# Patient Record
Sex: FEMALE | Race: BLACK OR AFRICAN AMERICAN | NOT HISPANIC OR LATINO | Employment: FULL TIME | URBAN - METROPOLITAN AREA
[De-identification: names, ages, dates, MRNs, and addresses within clinical notes are randomized per-mention and may not be internally consistent; named-entity substitution may affect disease eponyms.]

---

## 2017-04-13 ENCOUNTER — TELEPHONE (OUTPATIENT)
Dept: DERMATOLOGY | Facility: CLINIC | Age: 32
End: 2017-04-13

## 2017-04-13 NOTE — TELEPHONE ENCOUNTER
Spoke with pt and let her know that clinic is closed on tomorrow and no one will be in. Pt stated that she will go to her PCP since it is a urgent situation.

## 2017-04-13 NOTE — TELEPHONE ENCOUNTER
----- Message from Precious Diane sent at 4/13/2017  1:12 PM CDT -----  Contact: PT  PT is trying to come in tomorrow if possible for a chemical burn.    PT callback @[phone]

## 2017-04-14 ENCOUNTER — PATIENT MESSAGE (OUTPATIENT)
Dept: INTERNAL MEDICINE | Facility: CLINIC | Age: 32
End: 2017-04-14

## 2017-04-16 ENCOUNTER — PATIENT MESSAGE (OUTPATIENT)
Dept: ADMINISTRATIVE | Facility: OTHER | Age: 32
End: 2017-04-16

## 2017-05-01 ENCOUNTER — PATIENT MESSAGE (OUTPATIENT)
Dept: INTERNAL MEDICINE | Facility: CLINIC | Age: 32
End: 2017-05-01

## 2017-05-01 DIAGNOSIS — T30.4 CHEMICAL BURN: Primary | ICD-10-CM

## 2017-06-01 ENCOUNTER — DOCUMENTATION ONLY (OUTPATIENT)
Dept: INTERNAL MEDICINE | Facility: CLINIC | Age: 32
End: 2017-06-01

## 2017-06-01 NOTE — PROGRESS NOTES
OUTSIDE RECORDS FROM [facility] REVIEWED    Diagnosed with vitiligo, acne vulgaris, postinflammatory hyperpigmentation.

## 2017-07-24 ENCOUNTER — OFFICE VISIT (OUTPATIENT)
Dept: OBSTETRICS AND GYNECOLOGY | Facility: CLINIC | Age: 32
End: 2017-07-24
Attending: OBSTETRICS & GYNECOLOGY
Payer: COMMERCIAL

## 2017-07-24 VITALS
WEIGHT: 141.13 LBS | SYSTOLIC BLOOD PRESSURE: 120 MMHG | HEIGHT: 67 IN | DIASTOLIC BLOOD PRESSURE: 70 MMHG | BODY MASS INDEX: 22.15 KG/M2

## 2017-07-24 DIAGNOSIS — N76.0 ACUTE VAGINITIS: ICD-10-CM

## 2017-07-24 DIAGNOSIS — Z01.419 WELL WOMAN EXAM WITH ROUTINE GYNECOLOGICAL EXAM: Primary | ICD-10-CM

## 2017-07-24 LAB
CANDIDA RRNA VAG QL PROBE: NEGATIVE
G VAGINALIS RRNA GENITAL QL PROBE: POSITIVE
T VAGINALIS RRNA GENITAL QL PROBE: NEGATIVE

## 2017-07-24 PROCEDURE — 99999 PR PBB SHADOW E&M-EST. PATIENT-LVL III: CPT | Mod: PBBFAC,,, | Performed by: OBSTETRICS & GYNECOLOGY

## 2017-07-24 PROCEDURE — 88175 CYTOPATH C/V AUTO FLUID REDO: CPT

## 2017-07-24 PROCEDURE — 87624 HPV HI-RISK TYP POOLED RSLT: CPT

## 2017-07-24 PROCEDURE — 99385 PREV VISIT NEW AGE 18-39: CPT | Mod: S$GLB,,, | Performed by: OBSTETRICS & GYNECOLOGY

## 2017-07-24 PROCEDURE — 87660 TRICHOMONAS VAGIN DIR PROBE: CPT

## 2017-07-24 PROCEDURE — 87480 CANDIDA DNA DIR PROBE: CPT

## 2017-07-24 RX ORDER — TACROLIMUS 1 MG/G
OINTMENT TOPICAL
COMMUNITY
Start: 2017-05-08 | End: 2018-05-24 | Stop reason: SDUPTHER

## 2017-07-24 RX ORDER — TRETINOIN 0.1 MG/G
GEL TOPICAL
Refills: 1 | COMMUNITY
Start: 2017-05-08 | End: 2018-04-30

## 2017-07-24 NOTE — PROGRESS NOTES
SUBJECTIVE:   32 y.o. female No obstetric history on file.  for annual routine Pap and checkup. Patient's last menstrual period was 07/03/2017 (exact date)..  She complains of vaginal itching.  She is not sexually active.  Her cycles are monthly and last 3-4 days        Past Medical History:   Diagnosis Date    Left thyroid nodule     Vitiligo      Past Surgical History:   Procedure Laterality Date    WISDOM TOOTH EXTRACTION       Social History     Social History    Marital status: Single     Spouse name: N/A    Number of children: N/A    Years of education: N/A     Occupational History    marketing      Social History Main Topics    Smoking status: Never Smoker    Smokeless tobacco: Never Used    Alcohol use Yes      Comment: socially    Drug use: No    Sexual activity: Not Currently     Birth control/ protection: None     Other Topics Concern    Not on file     Social History Narrative    No narrative on file     Family History   Problem Relation Age of Onset    Thyroid cancer Sister     Fibroids Mother     Hypertension Mother     Graves' disease Father     Ovarian cancer Neg Hx     Diabetes Neg Hx      OB History   No data available         Current Outpatient Prescriptions   Medication Sig Dispense Refill    tacrolimus (PROTOPIC) 0.1 % ointment       tretinoin (RETIN-A) 0.01 % gel apply TO DARK AREAS TWICE DAILY FOR UP TO 3 MONTHS  1     No current facility-administered medications for this visit.      Allergies: Review of patient's allergies indicates no known allergies.     ROS:  Constitutional: no weight loss, weight gain, fever, fatigue  Eyes:  No vision changes, glasses/contacts  ENT/Mouth: No ulcers, sinus problems, ears ringing, headache  Cardiovascular: No inability to lie flat, chest pain, exercise intolerance, swelling, heart palpitations  Respiratory: No wheezing, coughing blood, shortness of breath, or cough  Gastrointestinal: No diarrhea, bloody stool, nausea/vomiting,  "constipation, gas, hemorrhoids  Genitourinary: No blood in urine, painful urination, urgency of urination, frequency of urination, incomplete emptying, incontinence, abnormal bleeding, painful periods, heavy periods, vaginal discharge, vaginal odor, painful intercourse, sexual problems, bleeding after intercourse.  Musculoskeletal: No muscle weakness  Skin/Breast: No painful breasts, nipple discharge, masses, rash, ulcers  Neurological: No passing out, seizures, numbness, headache  Endocrine: No diabetes, hypothyroid, hyperthyroid, hot flashes, hair loss, abnormal hair growth, ance  Psychiatric: No depression, crying  Hematologic: No bruises, bleeding, swollen lymph nodes, anemia.      OBJECTIVE:   The patient appears well, alert, oriented x 3, in no distress.  /70   Ht 5' 6.5" (1.689 m)   Wt 64 kg (141 lb 1.5 oz)   LMP 07/03/2017 (Exact Date)   BMI 22.43 kg/m²   NECK: small goiter  SKIN: no acne, striae, hirsutism  BREAST EXAM: breasts appear normal, no suspicious masses, no skin or nipple changes or axillary nodes  ABDOMEN: no hernias, masses, or hepatosplenomegaly  GENITALIA: normal external genitalia, no erythema, no discharge  URETHRA: normal urethra, normal urethral meatus  VAGINA: Normal  CERVIX: no lesions or cervical motion tenderness  UTERUS: normal size, contour, position, consistency, mobility, non-tender  ADNEXA: normal adnexa and no mass, fullness, tenderness    \  ASSESSMENT:   Patti DENIS was seen today for annual exam, vaginal discharge and vaginal itching.    Diagnoses and all orders for this visit:    Well woman exam with routine gynecological exam  -     Liquid-based pap smear, screening  -     HPV High Risk Genotypes, PCR    Acute vaginitis  -     Vaginosis Screen by DNA Probe      "

## 2017-07-25 ENCOUNTER — PATIENT MESSAGE (OUTPATIENT)
Dept: OBSTETRICS AND GYNECOLOGY | Facility: CLINIC | Age: 32
End: 2017-07-25

## 2017-07-25 RX ORDER — METRONIDAZOLE 7.5 MG/G
1 GEL VAGINAL DAILY
Qty: 70 G | Refills: 0 | Status: SHIPPED | OUTPATIENT
Start: 2017-07-25 | End: 2017-07-30

## 2017-07-28 LAB
HPV HR 12 DNA CVX QL NAA+PROBE: NEGATIVE
HPV16 DNA SPEC QL NAA+PROBE: NEGATIVE
HPV18 DNA SPEC QL NAA+PROBE: NEGATIVE

## 2017-09-05 ENCOUNTER — PATIENT MESSAGE (OUTPATIENT)
Dept: OBSTETRICS AND GYNECOLOGY | Facility: CLINIC | Age: 32
End: 2017-09-05

## 2017-09-05 RX ORDER — FLUCONAZOLE 150 MG/1
150 TABLET ORAL DAILY
Qty: 1 TABLET | Refills: 1 | Status: SHIPPED | OUTPATIENT
Start: 2017-09-05 | End: 2017-09-06

## 2018-01-02 ENCOUNTER — TELEPHONE (OUTPATIENT)
Dept: INTERNAL MEDICINE | Facility: CLINIC | Age: 33
End: 2018-01-02

## 2018-01-02 ENCOUNTER — LAB VISIT (OUTPATIENT)
Dept: LAB | Facility: HOSPITAL | Age: 33
End: 2018-01-02
Attending: INTERNAL MEDICINE
Payer: COMMERCIAL

## 2018-01-02 ENCOUNTER — OFFICE VISIT (OUTPATIENT)
Dept: INTERNAL MEDICINE | Facility: CLINIC | Age: 33
End: 2018-01-02
Payer: COMMERCIAL

## 2018-01-02 VITALS
TEMPERATURE: 98 F | OXYGEN SATURATION: 98 % | SYSTOLIC BLOOD PRESSURE: 100 MMHG | DIASTOLIC BLOOD PRESSURE: 68 MMHG | HEART RATE: 98 BPM | BODY MASS INDEX: 22.43 KG/M2 | WEIGHT: 139.56 LBS | HEIGHT: 66 IN

## 2018-01-02 DIAGNOSIS — E04.1 LEFT THYROID NODULE: ICD-10-CM

## 2018-01-02 DIAGNOSIS — K64.4 EXTERNAL HEMORRHOID: ICD-10-CM

## 2018-01-02 DIAGNOSIS — Z80.8 FAMILY HISTORY OF THYROID CANCER: ICD-10-CM

## 2018-01-02 DIAGNOSIS — J06.9 UPPER RESPIRATORY TRACT INFECTION, UNSPECIFIED TYPE: Primary | ICD-10-CM

## 2018-01-02 DIAGNOSIS — J06.9 UPPER RESPIRATORY TRACT INFECTION, UNSPECIFIED TYPE: ICD-10-CM

## 2018-01-02 DIAGNOSIS — K64.4 EXTERNAL HEMORRHOID: Primary | ICD-10-CM

## 2018-01-02 LAB
ALBUMIN SERPL BCP-MCNC: 4.2 G/DL
ALP SERPL-CCNC: 67 U/L
ALT SERPL W/O P-5'-P-CCNC: 13 U/L
ANION GAP SERPL CALC-SCNC: 7 MMOL/L
AST SERPL-CCNC: 22 U/L
BASOPHILS # BLD AUTO: 0.01 K/UL
BASOPHILS NFR BLD: 0.4 %
BILIRUB SERPL-MCNC: 0.6 MG/DL
BUN SERPL-MCNC: 5 MG/DL
CALCIUM SERPL-MCNC: 9.2 MG/DL
CHLORIDE SERPL-SCNC: 105 MMOL/L
CO2 SERPL-SCNC: 27 MMOL/L
CREAT SERPL-MCNC: 0.8 MG/DL
DIFFERENTIAL METHOD: ABNORMAL
EOSINOPHIL # BLD AUTO: 0 K/UL
EOSINOPHIL NFR BLD: 0.4 %
ERYTHROCYTE [DISTWIDTH] IN BLOOD BY AUTOMATED COUNT: 12.8 %
EST. GFR  (AFRICAN AMERICAN): >60 ML/MIN/1.73 M^2
EST. GFR  (NON AFRICAN AMERICAN): >60 ML/MIN/1.73 M^2
FLUAV AG SPEC QL IA: NEGATIVE
FLUBV AG SPEC QL IA: NEGATIVE
GLUCOSE SERPL-MCNC: 87 MG/DL
HCT VFR BLD AUTO: 38.7 %
HGB BLD-MCNC: 12.1 G/DL
LYMPHOCYTES # BLD AUTO: 0.9 K/UL
LYMPHOCYTES NFR BLD: 38.1 %
MCH RBC QN AUTO: 27.8 PG
MCHC RBC AUTO-ENTMCNC: 31.3 G/DL
MCV RBC AUTO: 89 FL
MONOCYTES # BLD AUTO: 0.7 K/UL
MONOCYTES NFR BLD: 30.3 %
NEUTROPHILS # BLD AUTO: 0.7 K/UL
NEUTROPHILS NFR BLD: 30.8 %
NRBC BLD-RTO: 0 /100 WBC
PLATELET # BLD AUTO: 137 K/UL
PMV BLD AUTO: 12.1 FL
POTASSIUM SERPL-SCNC: 3.8 MMOL/L
PROT SERPL-MCNC: 7.4 G/DL
RBC # BLD AUTO: 4.36 M/UL
SODIUM SERPL-SCNC: 139 MMOL/L
SPECIMEN SOURCE: NORMAL
T4 FREE SERPL-MCNC: 0.96 NG/DL
TSH SERPL DL<=0.005 MIU/L-ACNC: 0.3 UIU/ML
WBC # BLD AUTO: 2.31 K/UL

## 2018-01-02 PROCEDURE — 80053 COMPREHEN METABOLIC PANEL: CPT

## 2018-01-02 PROCEDURE — 99999 PR PBB SHADOW E&M-EST. PATIENT-LVL IV: CPT | Mod: PBBFAC,,, | Performed by: INTERNAL MEDICINE

## 2018-01-02 PROCEDURE — 84443 ASSAY THYROID STIM HORMONE: CPT

## 2018-01-02 PROCEDURE — 84439 ASSAY OF FREE THYROXINE: CPT

## 2018-01-02 PROCEDURE — 87400 INFLUENZA A/B EACH AG IA: CPT

## 2018-01-02 PROCEDURE — 85025 COMPLETE CBC W/AUTO DIFF WBC: CPT

## 2018-01-02 PROCEDURE — 36415 COLL VENOUS BLD VENIPUNCTURE: CPT

## 2018-01-02 PROCEDURE — 99214 OFFICE O/P EST MOD 30 MIN: CPT | Mod: S$GLB,,, | Performed by: INTERNAL MEDICINE

## 2018-01-02 RX ORDER — METHYLPREDNISOLONE 4 MG/1
TABLET ORAL
Qty: 1 PACKAGE | Refills: 0 | Status: SHIPPED | OUTPATIENT
Start: 2018-01-02 | End: 2018-01-23

## 2018-01-02 RX ORDER — SENNOSIDES 25 MG/1
TABLET, FILM COATED ORAL
Qty: 30 G | Refills: 1 | Status: SHIPPED | OUTPATIENT
Start: 2018-01-02 | End: 2018-04-30

## 2018-01-02 NOTE — PROGRESS NOTES
"Subjective:       Patient ID: Puja Hernandez is a 32 y.o. female.    Chief Complaint: Extremity Weakness and Hemorrhoids    HPI urgent    5 days ago, difficulty swallowing 2/2 feeling of throat swelling. Able to swallow though. Prior to that she was at a rooftop bar. No itching. Woke up on Sat and everything went back to normal. Noticed pain in the anus when sitting. Reports this is due to hemorrhoids (able to feel it). No bloody stools. Using preparation H OTC. 1 of them improved but the other was still present.   Sunday, felt diffuse weakness and body aches, chills, fatigue. Cough, unproductive. No rhinorrhea. No nasal congestion.   Mon - took theraflu and slept the whole day. Found old amoxil and took that.   Today - feel a little better. When she woke up this morning still w/ some subjective fever and chills and sore throat. However not currently. Doesn't think any swollen glands.     Review of Systems   Constitutional: Positive for activity change. Negative for unexpected weight change.   HENT: Negative for hearing loss, rhinorrhea and trouble swallowing.    Eyes: Negative for discharge and visual disturbance.   Respiratory: Negative for chest tightness and wheezing.    Cardiovascular: Negative for chest pain and palpitations.   Gastrointestinal: Negative for blood in stool, constipation, diarrhea and vomiting.   Endocrine: Negative for polydipsia and polyuria.   Genitourinary: Negative for difficulty urinating, dysuria, hematuria and menstrual problem.   Musculoskeletal: Negative for arthralgias, joint swelling and neck pain.   Neurological: Positive for weakness and headaches.   Psychiatric/Behavioral: Negative for confusion and dysphoric mood.     as above in HPI.     Objective:      Physical Exam    /68   Pulse 98   Temp 97.9 °F (36.6 °C)   Ht 5' 6" (1.676 m)   Wt 63.3 kg (139 lb 8.8 oz)   SpO2 98%   BMI 22.52 kg/m²     Gen - A+OX4, NAD  HEENT - PERRL, OP mildly erythematous. TM normal. No sinus " tenderness to palpation. Mildly erythematous nasal turbinates.   Neck - no LAD. Large L thyroid nodule.   CV - RRR  CHEST - CTAB, no wheezing/rhonchi/crackles. No stridor.   Abd - S/NT/ND/+BS  Ext -2 + B radial and DP pulses. No LE edema.   MSK - No spinal tenderness to palpation.   Rectal - large ext hemorrhoid w/ pain to touch. No fissures.    Assessment/Plan     Puja DENIS was seen today for extremity weakness and hemorrhoids.    Diagnoses and all orders for this visit:    Upper respiratory tract infection, unspecified type  -     Influenza antigen Nasal Swab  -     methylPREDNISolone (MEDROL DOSEPACK) 4 mg tablet; use as directed  -     CBC auto differential; Future  -     Comprehensive metabolic panel; Future    Left thyroid nodule  -     US Soft Tissue Head Neck Thyroid; Future  -     TSH; Future    Family history of thyroid cancer  -     US Soft Tissue Head Neck Thyroid; Future  -     TSH; Future    External hemorrhoid - warm sitz bath. Avoid constipation. High fiber diet. Stool softeners as needed.   -     lidocaine-hydrocortisone-aloe 2-2 % Kit; Place 1 application rectally 2 (two) times daily.  -     Ambulatory Referral to Colorectal Surgery      Return in about 3 months (around 4/2/2018).      Herlinda Hernandez MD  Department of Internal Medicine - [facility]  2:18 PM

## 2018-01-02 NOTE — TELEPHONE ENCOUNTER
----- Message from Maulik Stanford sent at 1/2/2018  3:42 PM CST -----  Contact: Patient [phone]  She said the pharmacy sent a fax to you today to put in a cheaper medication in lieu of the lidocaine-hydrocortisone-aloe 2-2 % Kit.    She is also making the request for a more affordable prescription.    Thank you

## 2018-01-03 ENCOUNTER — TELEPHONE (OUTPATIENT)
Dept: INTERNAL MEDICINE | Facility: CLINIC | Age: 33
End: 2018-01-03

## 2018-01-03 ENCOUNTER — HOSPITAL ENCOUNTER (OUTPATIENT)
Dept: RADIOLOGY | Facility: OTHER | Age: 33
Discharge: HOME | End: 2018-01-03
Attending: INTERNAL MEDICINE
Payer: COMMERCIAL

## 2018-01-03 DIAGNOSIS — Z80.8 FAMILY HISTORY OF THYROID CANCER: ICD-10-CM

## 2018-01-03 DIAGNOSIS — E04.1 THYROID NODULE: ICD-10-CM

## 2018-01-03 DIAGNOSIS — D72.810 LYMPHOCYTOPENIA: ICD-10-CM

## 2018-01-03 DIAGNOSIS — E04.1 LEFT THYROID NODULE: ICD-10-CM

## 2018-01-03 DIAGNOSIS — D69.6 THROMBOCYTOPENIA: ICD-10-CM

## 2018-01-03 DIAGNOSIS — Z00.00 ANNUAL PHYSICAL EXAM: ICD-10-CM

## 2018-01-03 DIAGNOSIS — E05.90 SUBCLINICAL HYPERTHYROIDISM: Primary | ICD-10-CM

## 2018-01-03 PROCEDURE — 76536 US EXAM OF HEAD AND NECK: CPT | Mod: TC

## 2018-01-03 PROCEDURE — 76536 US EXAM OF HEAD AND NECK: CPT | Mod: 26,,, | Performed by: RADIOLOGY

## 2018-01-03 NOTE — TELEPHONE ENCOUNTER
Already called pt and left VM to call back. Thyroid-stimulating hormone is mildly low.  However her active thyroid hormones are normal.  Her white cell count and platelets are also mildly low. As she was feeling ill, this may be a fluke.  Let's repeat the lab work including a fasting cholesterol panel a week prior to her returning to see me in March.    Enlarged thyroid nodule 2.1cm in 2011-->5.7cm on US yesterday. F/u w/ endocrinology.

## 2018-01-03 NOTE — TELEPHONE ENCOUNTER
----- Message from Lizbeth Hardin sent at 1/3/2018  2:14 PM CST -----  Contact: Patient [phone]  Returned Call    Who left the message: Herlinda Hernandez MD    When did the practice call: 01/03/2018 7:11am    Please advise.    Thank You

## 2018-04-30 ENCOUNTER — PATIENT MESSAGE (OUTPATIENT)
Dept: INTERNAL MEDICINE | Facility: CLINIC | Age: 33
End: 2018-04-30

## 2018-04-30 ENCOUNTER — OFFICE VISIT (OUTPATIENT)
Dept: ENDOCRINOLOGY | Facility: CLINIC | Age: 33
End: 2018-04-30
Payer: COMMERCIAL

## 2018-04-30 VITALS
DIASTOLIC BLOOD PRESSURE: 68 MMHG | HEIGHT: 66 IN | SYSTOLIC BLOOD PRESSURE: 110 MMHG | BODY MASS INDEX: 21.44 KG/M2 | WEIGHT: 133.38 LBS | HEART RATE: 70 BPM

## 2018-04-30 DIAGNOSIS — E04.1 LEFT THYROID NODULE: Primary | ICD-10-CM

## 2018-04-30 DIAGNOSIS — R79.89 ABNORMAL TSH: ICD-10-CM

## 2018-04-30 DIAGNOSIS — L81.9 HYPOPIGMENTATION: Primary | ICD-10-CM

## 2018-04-30 PROCEDURE — 99999 PR PBB SHADOW E&M-EST. PATIENT-LVL III: CPT | Mod: PBBFAC,,, | Performed by: INTERNAL MEDICINE

## 2018-04-30 PROCEDURE — 99204 OFFICE O/P NEW MOD 45 MIN: CPT | Mod: S$GLB,,, | Performed by: INTERNAL MEDICINE

## 2018-04-30 RX ORDER — ASCORBIC ACID 1000 MG
1 TABLET ORAL DAILY
COMMUNITY
End: 2024-02-07

## 2018-04-30 NOTE — PROGRESS NOTES
"Subjective:      Patient ID: Puja Hernandez is a 33 y.o. female.    Chief Complaint: Thyroid nodule     is presenting for new evaluation and treatment of ***    Review of Systems    Objective:     /68 (BP Location: Left arm, Patient Position: Sitting, BP Method: Medium (Manual))   Pulse 70   Ht 5' 6" (1.676 m)   Wt 60.5 kg (133 lb 6.1 oz)   LMP 04/17/2018   BMI 21.53 kg/m²      Physical Exam    Assessment:     1. Left thyroid nodule        Plan:   No problem-specific Assessment & Plan notes found for this encounter.      No Follow-up on file.    Discussed with  ***    Grady Jimenez MD  "

## 2018-04-30 NOTE — PROGRESS NOTES
Subjective:      Patient ID: Puja Hernandez is a 33 y.o. female.    Chief Complaint: Thyroid nodules     is presenting for new evaluation and treatment of thyroid nodule.    Had previously seen Dr. Marlow in 2011.    Large left thyroid nodule that has grown in size from 2 to 5cm. (from 2011 to 2018 US study).  Had prior fna of left thyroid nodule in 2011 that was benign. No other FNA's performed.    1/2018 thyroid US:  The thyroid is normal in size.    Right lobe: 4.2 x 1.3 x 2.0 cm. 4 mm cystic nodule is TI-RADS category 1.    Left lobe: 6.6 x 2.7 x 3.6 cm.     5.7 cm solid, primarily mildly hypoechoic, wider than tall nodule with mild irregularity of its margins and no echogenic foci is TI-RADS category 4, previously measured 2.1 cm on 5/26/11.    Normal appearing cervical chain lymph nodes are visualized.   Impression       5.7 cm TI-RADS category for nodule of the left thyroid lobe, enlarged from 2.1 cm. FNA is recommended for TI-RADS category 4 nodules measuring 1.5 cm or larger; however, note is made that this nodule reportedly previously underwent FNA in 2011 with benign pathology; clinical considerations will determine the need for additional FNA.     Endorses night sweats, hair loss, tremors.  Denies dysphagia, hoarseness, difficulty breathing    Half sister with thyroid cancer (same father)  Dad has Grave's disease and had thyroid surgery    Works in marketing  No formal exercise        Review of Systems   Constitutional: Positive for diaphoresis. Negative for unexpected weight change.   Eyes: Negative for visual disturbance.   Respiratory: Negative for shortness of breath.    Cardiovascular: Negative for chest pain.   Gastrointestinal: Negative for abdominal pain.   Musculoskeletal: Negative for arthralgias.   Skin: Negative for wound.        Hair loss   Neurological: Positive for tremors. Negative for headaches.   Hematological: Does not bruise/bleed easily.   Psychiatric/Behavioral:  "Negative for sleep disturbance.       Objective:     /68 (BP Location: Left arm, Patient Position: Sitting, BP Method: Medium (Manual))   Pulse 70   Ht 5' 6" (1.676 m)   Wt 60.5 kg (133 lb 6.1 oz)   LMP 04/17/2018   BMI 21.53 kg/m²      Physical Exam   Constitutional: She appears well-developed and well-nourished.   HENT:   Head: Normocephalic and atraumatic.   Eyes: Conjunctivae are normal. Right eye exhibits no discharge. Left eye exhibits no discharge.   Neck: Normal range of motion. No tracheal deviation present. Thyromegaly present.   Large left thyroid nodule  Negative Sitka's    Cardiovascular: Normal rate.    No murmur heard.  Pulmonary/Chest: Effort normal and breath sounds normal.   Abdominal: Bowel sounds are normal.   Musculoskeletal: Normal range of motion. She exhibits no edema.   Skin: Skin is warm and dry.   Scattered vitiligo    Psychiatric: She has a normal mood and affect. Judgment normal.   Nursing note and vitals reviewed.    Results for JABIER REEVES (MRN [id number])    Ref. Range 5/26/2011 10:04 7/26/2011 12:25 5/14/2015 11:51 1/2/2018 14:56   TSH Latest Ref Range: 0.400 - 4.000 uIU/mL 0.730  0.770 0.300 (L)   Free T4 Latest Ref Range: 0.71 - 1.51 ng/dL   1.30 0.96   Thyroperoxidase Antibodies Latest Ref Range: <6.0 IU/mL  Negative <6.0        Assessment:     1. Left thyroid nodule    2. Abnormal TSH        Plan:   1. Given interval enlargement of thyroid nodule would recommend repeat FNA. If benign, would still consider surgical lobectomy given size and interval growth. Discussed that despite only lobectomy it's possible that she could require thyroid hormone replacement, however that would not be known till after surgery. With a suppressed TSH, it's possible that nodule is hot. However, would still consider FNA given interval growth.  2. Recheck TSH. Had been previously ordered by Dr. Hernandez and scheduled for tomorrow May 1. Have asked Ms. Reeves to email me later this week " after she has had labs drawn.    No Follow-up on file.    Discussed with Dr. Kal Jimenez MD     I, Radha Bowden MD,  have personally taken the history and examined the patient and agree with the resident's note as stated above.

## 2018-05-01 ENCOUNTER — PATIENT MESSAGE (OUTPATIENT)
Dept: ENDOCRINOLOGY | Facility: CLINIC | Age: 33
End: 2018-05-01

## 2018-05-01 ENCOUNTER — LAB VISIT (OUTPATIENT)
Dept: LAB | Facility: HOSPITAL | Age: 33
End: 2018-05-01
Attending: INTERNAL MEDICINE
Payer: COMMERCIAL

## 2018-05-01 DIAGNOSIS — E05.90 SUBCLINICAL HYPERTHYROIDISM: ICD-10-CM

## 2018-05-01 DIAGNOSIS — D69.6 THROMBOCYTOPENIA: ICD-10-CM

## 2018-05-01 DIAGNOSIS — D72.810 LYMPHOCYTOPENIA: ICD-10-CM

## 2018-05-01 DIAGNOSIS — Z00.00 ANNUAL PHYSICAL EXAM: ICD-10-CM

## 2018-05-01 LAB
BASOPHILS # BLD AUTO: 0.01 K/UL
BASOPHILS NFR BLD: 0.3 %
CHOLEST SERPL-MCNC: 164 MG/DL
CHOLEST/HDLC SERPL: 3 {RATIO}
DIFFERENTIAL METHOD: ABNORMAL
EOSINOPHIL # BLD AUTO: 0.1 K/UL
EOSINOPHIL NFR BLD: 1.4 %
ERYTHROCYTE [DISTWIDTH] IN BLOOD BY AUTOMATED COUNT: 12.8 %
HCT VFR BLD AUTO: 37.6 %
HDLC SERPL-MCNC: 55 MG/DL
HDLC SERPL: 33.5 %
HGB BLD-MCNC: 12 G/DL
IMM GRANULOCYTES # BLD AUTO: 0 K/UL
IMM GRANULOCYTES NFR BLD AUTO: 0 %
LDLC SERPL CALC-MCNC: 94.2 MG/DL
LYMPHOCYTES # BLD AUTO: 1.6 K/UL
LYMPHOCYTES NFR BLD: 43 %
MCH RBC QN AUTO: 28.3 PG
MCHC RBC AUTO-ENTMCNC: 31.9 G/DL
MCV RBC AUTO: 89 FL
MONOCYTES # BLD AUTO: 0.3 K/UL
MONOCYTES NFR BLD: 7.7 %
NEUTROPHILS # BLD AUTO: 1.7 K/UL
NEUTROPHILS NFR BLD: 47.6 %
NONHDLC SERPL-MCNC: 109 MG/DL
NRBC BLD-RTO: 0 /100 WBC
PLATELET # BLD AUTO: 148 K/UL
PMV BLD AUTO: 11.7 FL
RBC # BLD AUTO: 4.24 M/UL
T4 FREE SERPL-MCNC: 0.94 NG/DL
TRIGL SERPL-MCNC: 74 MG/DL
TSH SERPL DL<=0.005 MIU/L-ACNC: 0.86 UIU/ML
WBC # BLD AUTO: 3.63 K/UL

## 2018-05-01 PROCEDURE — 84443 ASSAY THYROID STIM HORMONE: CPT

## 2018-05-01 PROCEDURE — 36415 COLL VENOUS BLD VENIPUNCTURE: CPT

## 2018-05-01 PROCEDURE — 85025 COMPLETE CBC W/AUTO DIFF WBC: CPT

## 2018-05-01 PROCEDURE — 80061 LIPID PANEL: CPT

## 2018-05-01 PROCEDURE — 84439 ASSAY OF FREE THYROXINE: CPT

## 2018-05-03 ENCOUNTER — HOSPITAL ENCOUNTER (OUTPATIENT)
Dept: RADIOLOGY | Facility: HOSPITAL | Age: 33
Discharge: HOME | End: 2018-05-03
Attending: INTERNAL MEDICINE
Payer: COMMERCIAL

## 2018-05-03 DIAGNOSIS — E04.1 LEFT THYROID NODULE: ICD-10-CM

## 2018-05-03 PROCEDURE — 10022 US FINE NEEDLE ASPIRATION WITH IMAGING: CPT

## 2018-05-03 PROCEDURE — 10022 US FINE NEEDLE ASPIRATION WITH IMAGING: CPT | Mod: ,,, | Performed by: RADIOLOGY

## 2018-05-03 PROCEDURE — 88173 CYTOPATH EVAL FNA REPORT: CPT | Mod: 26,,, | Performed by: PATHOLOGY

## 2018-05-03 PROCEDURE — 88173 CYTOPATH EVAL FNA REPORT: CPT | Performed by: PATHOLOGY

## 2018-05-03 PROCEDURE — 76942 ECHO GUIDE FOR BIOPSY: CPT | Mod: 26,,, | Performed by: RADIOLOGY

## 2018-05-03 NOTE — H&P
Radiology History & Physical      SUBJECTIVE:     Chief Complaint: thyroid nodule, left.    History of Present Illness:  Puja Hernandez is a 33 y.o. female who presents for thyroid FNA.    Past Medical History:   Diagnosis Date    Left thyroid nodule     Vitiligo      Past Surgical History:   Procedure Laterality Date    WISDOM TOOTH EXTRACTION         Home Meds:   Prior to Admission medications    Medication Sig Start Date End Date Taking? Authorizing Provider   ginkgo biloba 40 mg Tab Take 1 tablet by mouth Daily.    Historical Provider, MD   tacrolimus (PROTOPIC) 0.1 % ointment  5/8/17   Historical Provider, MD     Anticoagulants/Antiplatelets: no anticoagulation    Allergies: Review of patient's allergies indicates:  No Known Allergies  Sedation History:  no adverse reactions    Review of Systems:   Hematological: no known coagulopathies  Respiratory: no shortness of breath  Cardiovascular: no chest pain  Gastrointestinal: no abdominal pain  Genito-Urinary: no dysuria  Musculoskeletal: negative  Neurological: no TIA or stroke symptoms         OBJECTIVE:     Vital Signs (Most Recent)       Physical Exam:  ASA: 1  Mallampati: 1    General: no acute distress  Mental Status: alert and oriented to person, place and time  HEENT: normocephalic, atraumatic  Chest: unlabored breathing  Heart: regular heart rate  Abdomen: nondistended  Extremity: moves all extremities    Laboratory  No results found for: INR    Lab Results   Component Value Date    WBC 3.63 (L) 05/01/2018    HGB 12.0 05/01/2018    HCT 37.6 05/01/2018    MCV 89 05/01/2018     (L) 05/01/2018      Lab Results   Component Value Date    GLU 87 01/02/2018     01/02/2018    K 3.8 01/02/2018     01/02/2018    CO2 27 01/02/2018    BUN 5 (L) 01/02/2018    CREATININE 0.8 01/02/2018    CALCIUM 9.2 01/02/2018    ALT 13 01/02/2018    AST 22 01/02/2018    ALBUMIN 4.2 01/02/2018    BILITOT 0.6 01/02/2018       ASSESSMENT/PLAN:     Sedation Plan:  none  Patient will undergo US guided FNA.    Khadar Ceja MD  Radiology

## 2018-05-03 NOTE — PROCEDURES
Radiology Post-Procedure Note    Pre Op Diagnosis: left thyroid nodule    Post Op Diagnosis: left thyroid nodule    Procedure: Ultrasound guided thyroid biopsy    Procedure performed by: Nelly Glover MD    Written Informed Consent Obtained: Yes    Specimen Removed: YES 5 pass FNA    Estimated Blood Loss: Minimal    Findings: Local anesthesia was used.    Finding aspiration was performed for evaluation of left sided thyroid nodule using ultrasound guidance.  The specimen was sent to the laboratory for further analysis.    There were no complications and the patient tolerated the procedure well.  Please see Imaging report for further details.    Khadar Ceja MD  Radiology

## 2018-05-07 ENCOUNTER — PATIENT MESSAGE (OUTPATIENT)
Dept: ENDOCRINOLOGY | Facility: HOSPITAL | Age: 33
End: 2018-05-07

## 2018-05-24 ENCOUNTER — OFFICE VISIT (OUTPATIENT)
Dept: DERMATOLOGY | Facility: CLINIC | Age: 33
End: 2018-05-24
Payer: COMMERCIAL

## 2018-05-24 DIAGNOSIS — L80 VITILIGO: Primary | ICD-10-CM

## 2018-05-24 DIAGNOSIS — L70.0 ACNE VULGARIS: ICD-10-CM

## 2018-05-24 DIAGNOSIS — E04.1 THYROID NODULE: ICD-10-CM

## 2018-05-24 DIAGNOSIS — Z13.0 ENCOUNTER FOR SCREENING FOR DISEASES OF THE BLOOD AND BLOOD-FORMING ORGANS AND CERTAIN DISORDERS INVOLVING THE IMMUNE MECHANISM: ICD-10-CM

## 2018-05-24 DIAGNOSIS — Z13.29 SCREENING FOR THYROID DISORDER: ICD-10-CM

## 2018-05-24 DIAGNOSIS — Z13.21 ENCOUNTER FOR VITAMIN DEFICIENCY SCREENING: ICD-10-CM

## 2018-05-24 PROCEDURE — 99203 OFFICE O/P NEW LOW 30 MIN: CPT | Mod: S$GLB,,, | Performed by: DERMATOLOGY

## 2018-05-24 RX ORDER — TACROLIMUS 1 MG/G
OINTMENT TOPICAL
Qty: 60 G | Refills: 5 | Status: SHIPPED | OUTPATIENT
Start: 2018-05-24 | End: 2020-01-06 | Stop reason: SDUPTHER

## 2018-05-24 RX ORDER — FLUOCINONIDE 0.5 MG/G
CREAM TOPICAL
Qty: 60 G | Refills: 2 | Status: SHIPPED | OUTPATIENT
Start: 2018-05-24 | End: 2020-01-06 | Stop reason: SDUPTHER

## 2018-05-24 RX ORDER — CLINDAMYCIN PHOSPHATE 10 MG/G
GEL TOPICAL
Qty: 60 G | Refills: 3 | Status: SHIPPED | OUTPATIENT
Start: 2018-05-24 | End: 2020-03-16 | Stop reason: SDUPTHER

## 2018-05-24 RX ORDER — DEXAMETHASONE 4 MG/1
TABLET ORAL
Qty: 16 TABLET | Refills: 0 | Status: SHIPPED | OUTPATIENT
Start: 2018-05-24 | End: 2019-01-31 | Stop reason: SDUPTHER

## 2018-05-24 NOTE — LETTER
May 24, 2018      Herlinda Hernandez MD  [address]  [place] [address]           [facility] - Dermatology  [address] [place] [id number]  Phone: [phone]  Fax: [phone]          Patient: Puja Hernandez   MR Number: [id number]   YOB: 1985   Date of Visit: 5/24/2018       Dear Dr. Herlinda Hernandez:    Thank you for referring Puja Hernandez to me for evaluation. Attached you will find relevant portions of my assessment and plan of care.    If you have questions, please do not hesitate to call me. I look forward to following Puja Hernandez along with you.    Sincerely,    Rebekah Joyce MD    Enclosure  CC:  No Recipients    If you would like to receive this communication electronically, please contact [email or web address] or [phone] to request more information on [employer] access.    For providers and/or their staff who would like to refer a patient to [facility], please contact us through our one-stop-shop provider referral line, [facility], at [phone].    If you feel you have received this communication in error or would no longer like to receive these types of communications, please e-mail [email or web address]

## 2018-05-24 NOTE — PROGRESS NOTES
Subjective:       Patient ID:  Puja Hernandez is a 33 y.o. female who presents for   Chief Complaint   Patient presents with    Acne     cystic bumps, hyperpigmation, 3 years, OTC not helping    Vitiligo     getting worse past 3 weeks, comes and goes,      Pt is here today with a c/o of acne to her face. Pt states that her acne has become deep and cystic. Acne is leaving hyperpigmented spots behind. Currently washing with cerave. Pt states that her skin is sensitive. She states that her skin is combination. Pt also complains of vitiligo. Pt states that she has had 1 or 2 stable areas of vitiligo on her right hand and elbow, but now she is getting several new spots for the last 2-3 weeks, and they are more pronounced.       Acne  - Initial  Affected locations: face  Duration: 3 years  Signs / symptoms: irritated and tender  Severity: mild  Timing: constant  Aggravated by: picking, stress and menses (diet)  Treatments tried: cerave wash; retin a was too harsh.  Improvement on treatment: mild    Vitiligo  - Initial  Affected locations: right upper leg, left upper leg, left arm, right arm, neck and chest  Duration: 3 weeks (has had spots off and on since age 16)  Signs and Symptoms: discoloration.  Severity: mild  Timing: constant  Aggravated by: stress (chemicals?)  Treatments tried: uses protopic periodically, bought a nbuvb lamp online but doesn't help much.  Improvement on treatment: no relief        Review of Systems   Skin: Negative for itching and rash.   Hematologic/Lymphatic: Does not bruise/bleed easily.        Objective:    Physical Exam   Constitutional: She appears well-developed and well-nourished. No distress.   HENT:   Mouth/Throat: Lips normal.    Eyes: Abnormal Lids.   (hypopigmented patch on left lower lid)No conjunctival no injection.   Neurological: She is alert and oriented to person, place, and time. She is not disoriented.   Psychiatric: She has a normal mood and affect.   Skin:   Areas  Examined (abnormalities noted in diagram):   Scalp / Hair Palpated and Inspected  Head / Face Inspection Performed  Neck Inspection Performed  Chest / Axilla Inspection Performed  Back Inspection Performed  RUE Inspected  LUE Inspection Performed  RLE Inspected  LLE Inspection Performed  Nails and Digits Inspection Performed                   Diagram Legend     Erythematous scaling macule/papule c/w actinic keratosis       Vascular papule c/w angioma      Pigmented verrucoid papule/plaque c/w seborrheic keratosis      Yellow umbilicated papule c/w sebaceous hyperplasia      Irregularly shaped tan macule c/w lentigo     1-2 mm smooth white papules consistent with Milia      Movable subcutaneous cyst with punctum c/w epidermal inclusion cyst      Subcutaneous movable cyst c/w pilar cyst      Firm pink to brown papule c/w dermatofibroma      Pedunculated fleshy papule(s) c/w skin tag(s)      Evenly pigmented macule c/w junctional nevus     Mildly variegated pigmented, slightly irregular-bordered macule c/w mildly atypical nevus      Flesh colored to evenly pigmented papule c/w intradermal nevus       Pink pearly papule/plaque c/w basal cell carcinoma      Erythematous hyperkeratotic cursted plaque c/w SCC      Surgical scar with no sign of skin cancer recurrence      Open and closed comedones      Inflammatory papules and pustules      Verrucoid papule consistent consistent with wart     Erythematous eczematous patches and plaques     Dystrophic onycholytic nail with subungual debris c/w onychomycosis     Umbilicated papule    Erythematous-base heme-crusted tan verrucoid plaque consistent with inflamed seborrheic keratosis     Erythematous Silvery Scaling Plaque c/w Psoriasis     See annotation      Assessment / Plan:        Vitiligo  - Discussed diagnosis, prognosis, and treatment options for vitiligo. Due to the recent worsening, will add in oral steroid treatment in addition to topicals in an attempt to halt  progression.  -     dexamethasone (DECADRON) 4 MG Tab; Take 1 tab PO with breakfast on Saturdays and Sundays x 8 weeks.  Dispense: 16 tablet; Refill: 0  -     fluocinonide 0.05% (LIDEX) 0.05 % cream; Apply to affected areas of body daily-BID x 1 week on, then 1 week off.  Dispense: 60 g; Refill: 2  -     tacrolimus (PROTOPIC) 0.1 % ointment; Apply to affected areas of face and body BID prn vitiligo.  Dispense: 60 g; Refill: 5  - Discussed in-office nbUVB phototherapy.     Screening for thyroid disorder; Encounter for vitamin deficiency screening; Encounter for screening for diseases of the blood and blood-forming organs and certain disorders involving the immune mechanism  -  Will check labs for any underlying disorders that may be contributing to her recent flare of vitiligo.  - TSH and free T4 were wnl earlier this month.  -     Vitamin B12; Future  -     MICHAEL; Future  -     Thyroid stimulating immunoglobulin; Future  -     Thyrotropin receptor antibody; Future  -     Thyroid peroxidase antibody; Future  -     Thyroglobulin; Future  -     T3, free; Future  -     Vitamin D; Future  -     Comprehensive metabolic panel; Future    Acne vulgaris  -     clindamycin phosphate 1% (CLEOCIN T) 1 % gel; Apply to face BID prn acne  Dispense: 60 g; Refill: 3  -  Recommended a benzoyl peroxide (2-5%) wash, such as PanOxyl 4% Creamy Wash or Neutrogena Clear Pore Cleanser/Mask. Reminded patient that benzoyl peroxide can bleach towels, sheets, and clothing if not rinsed well from the skin.  -  Recommended a pea-sized amount of Differin gel to entire face qhs.  -  Counseled pt that the retinoid may make the skin dry, red, and irritated. May moisturize daily with a non-comedogenic moisturizer. If still dry, would recommend using the retinoid every other night or less frequently as tolerated. Avoid using around corners of eyes, nose, and mouth.  Patient instructed in importance of daily broad-spectrum sunscreen use with SPF of at  least 30. Sun avoidance and topical protection/protective clothing discussed.  Written instructions were provided.    Follow-up in about 4 weeks (around 6/21/2018).

## 2018-05-25 ENCOUNTER — LAB VISIT (OUTPATIENT)
Dept: LAB | Facility: HOSPITAL | Age: 33
End: 2018-05-25
Attending: DERMATOLOGY
Payer: COMMERCIAL

## 2018-05-25 DIAGNOSIS — L80 VITILIGO: ICD-10-CM

## 2018-05-25 LAB
25(OH)D3+25(OH)D2 SERPL-MCNC: 18 NG/ML
ALBUMIN SERPL BCP-MCNC: 4.5 G/DL
ALP SERPL-CCNC: 76 U/L
ALT SERPL W/O P-5'-P-CCNC: 10 U/L
ANION GAP SERPL CALC-SCNC: 10 MMOL/L
AST SERPL-CCNC: 22 U/L
BILIRUB SERPL-MCNC: 1.4 MG/DL
BUN SERPL-MCNC: 15 MG/DL
CALCIUM SERPL-MCNC: 9.6 MG/DL
CHLORIDE SERPL-SCNC: 103 MMOL/L
CO2 SERPL-SCNC: 24 MMOL/L
CREAT SERPL-MCNC: 0.9 MG/DL
EST. GFR  (AFRICAN AMERICAN): >60 ML/MIN/1.73 M^2
EST. GFR  (NON AFRICAN AMERICAN): >60 ML/MIN/1.73 M^2
GLUCOSE SERPL-MCNC: 78 MG/DL
POTASSIUM SERPL-SCNC: 3.7 MMOL/L
PROT SERPL-MCNC: 7.9 G/DL
SODIUM SERPL-SCNC: 137 MMOL/L
T3FREE SERPL-MCNC: 2.5 PG/ML
THYROPEROXIDASE IGG SERPL-ACNC: <6 IU/ML
VIT B12 SERPL-MCNC: 574 PG/ML

## 2018-05-25 PROCEDURE — 82306 VITAMIN D 25 HYDROXY: CPT

## 2018-05-25 PROCEDURE — 86376 MICROSOMAL ANTIBODY EACH: CPT

## 2018-05-25 PROCEDURE — 86038 ANTINUCLEAR ANTIBODIES: CPT

## 2018-05-25 PROCEDURE — 83520 IMMUNOASSAY QUANT NOS NONAB: CPT

## 2018-05-25 PROCEDURE — 82607 VITAMIN B-12: CPT

## 2018-05-25 PROCEDURE — 80053 COMPREHEN METABOLIC PANEL: CPT

## 2018-05-25 PROCEDURE — 84432 ASSAY OF THYROGLOBULIN: CPT

## 2018-05-25 PROCEDURE — 36415 COLL VENOUS BLD VENIPUNCTURE: CPT | Mod: PO

## 2018-05-25 PROCEDURE — 84481 FREE ASSAY (FT-3): CPT

## 2018-05-25 PROCEDURE — 84445 ASSAY OF TSI GLOBULIN: CPT

## 2018-05-26 LAB
THRYOGLOBULIN INTERPRETATION: ABNORMAL
THYROGLOB AB SERPL-ACNC: <1.8 IU/ML
THYROGLOB SERPL-MCNC: 198 NG/ML

## 2018-05-28 LAB — ANA SER QL IF: NORMAL

## 2018-05-29 ENCOUNTER — PATIENT MESSAGE (OUTPATIENT)
Dept: ENDOCRINOLOGY | Facility: CLINIC | Age: 33
End: 2018-05-29

## 2018-05-29 LAB
TSH RECEP AB SER-ACNC: <1 IU/L
TSI SER-ACNC: <0.1 IU/L

## 2018-06-03 ENCOUNTER — PATIENT MESSAGE (OUTPATIENT)
Dept: ENDOCRINOLOGY | Facility: CLINIC | Age: 33
End: 2018-06-03

## 2018-06-04 ENCOUNTER — PATIENT MESSAGE (OUTPATIENT)
Dept: INTERNAL MEDICINE | Facility: CLINIC | Age: 33
End: 2018-06-04

## 2018-06-04 DIAGNOSIS — E04.2 MULTIPLE THYROID NODULES: Primary | ICD-10-CM

## 2018-06-05 ENCOUNTER — TELEPHONE (OUTPATIENT)
Dept: INTERNAL MEDICINE | Facility: CLINIC | Age: 33
End: 2018-06-05

## 2018-06-06 ENCOUNTER — LAB VISIT (OUTPATIENT)
Dept: LAB | Facility: OTHER | Age: 33
End: 2018-06-06
Payer: COMMERCIAL

## 2018-06-06 DIAGNOSIS — E04.2 MULTIPLE THYROID NODULES: ICD-10-CM

## 2018-06-06 PROCEDURE — 36415 COLL VENOUS BLD VENIPUNCTURE: CPT

## 2018-06-06 PROCEDURE — 84255 ASSAY OF SELENIUM: CPT

## 2018-06-06 PROCEDURE — 83018 HEAVY METAL QUAN EACH NES: CPT

## 2018-06-08 LAB — IODINE SERPL-MCNC: 58 NG/ML (ref 40–92)

## 2018-06-09 ENCOUNTER — PATIENT MESSAGE (OUTPATIENT)
Dept: DERMATOLOGY | Facility: CLINIC | Age: 33
End: 2018-06-09

## 2018-06-09 LAB — SELENIUM SERPL-MCNC: 166 UG/L (ref 23–190)

## 2018-06-11 ENCOUNTER — PATIENT MESSAGE (OUTPATIENT)
Dept: INTERNAL MEDICINE | Facility: CLINIC | Age: 33
End: 2018-06-11

## 2018-06-11 DIAGNOSIS — R17 ELEVATED BILIRUBIN: Primary | ICD-10-CM

## 2018-06-12 ENCOUNTER — TELEPHONE (OUTPATIENT)
Dept: ENDOCRINOLOGY | Facility: CLINIC | Age: 33
End: 2018-06-12

## 2018-06-12 ENCOUNTER — PATIENT MESSAGE (OUTPATIENT)
Dept: ENDOCRINOLOGY | Facility: CLINIC | Age: 33
End: 2018-06-12

## 2018-12-11 DIAGNOSIS — L80 VITILIGO: ICD-10-CM

## 2018-12-11 RX ORDER — DEXAMETHASONE 4 MG/1
TABLET ORAL
Qty: 16 TABLET | Refills: 0 | OUTPATIENT
Start: 2018-12-11

## 2018-12-13 ENCOUNTER — TELEPHONE (OUTPATIENT)
Dept: DERMATOLOGY | Facility: CLINIC | Age: 33
End: 2018-12-13

## 2018-12-13 ENCOUNTER — PATIENT MESSAGE (OUTPATIENT)
Dept: DERMATOLOGY | Facility: CLINIC | Age: 33
End: 2018-12-13

## 2018-12-13 NOTE — TELEPHONE ENCOUNTER
Called PT and informed her that RX was denied and that she needed another appointment. LVM for PT to call back at earliest convenience.

## 2019-01-15 ENCOUNTER — PATIENT MESSAGE (OUTPATIENT)
Dept: DERMATOLOGY | Facility: CLINIC | Age: 34
End: 2019-01-15

## 2019-01-31 ENCOUNTER — OFFICE VISIT (OUTPATIENT)
Dept: DERMATOLOGY | Facility: CLINIC | Age: 34
End: 2019-01-31
Payer: COMMERCIAL

## 2019-01-31 DIAGNOSIS — R93.89 ABNORMAL IMAGING OF THYROID: ICD-10-CM

## 2019-01-31 DIAGNOSIS — E55.9 VITAMIN D DEFICIENCY: ICD-10-CM

## 2019-01-31 DIAGNOSIS — L80 VITILIGO: Primary | ICD-10-CM

## 2019-01-31 PROCEDURE — 99214 OFFICE O/P EST MOD 30 MIN: CPT | Mod: S$GLB,,, | Performed by: DERMATOLOGY

## 2019-01-31 PROCEDURE — 99214 PR OFFICE/OUTPT VISIT, EST, LEVL IV, 30-39 MIN: ICD-10-PCS | Mod: S$GLB,,, | Performed by: DERMATOLOGY

## 2019-01-31 RX ORDER — DEXAMETHASONE 4 MG/1
TABLET ORAL
Qty: 16 TABLET | Refills: 0 | Status: SHIPPED | OUTPATIENT
Start: 2019-01-31 | End: 2020-01-06 | Stop reason: SDUPTHER

## 2019-01-31 NOTE — Clinical Note
Andrew Bejarano,This pt with vitiligo would like to try nbUVB as she has previously had success treating it with light.Thanks,Dr. Joyce

## 2019-01-31 NOTE — PROGRESS NOTES
Subjective:       Patient ID:  Puja Hernandez is a 33 y.o. female who presents for   Chief Complaint   Patient presents with    Vitiligo     all over     Vitiligo did stabilize over the summer following oral dexamethasone. She did not use topical steroid or protopic because it felt greasy. Her vitiligo has recently been  worsening over the last couple of months. New spots on hands, chest, neck, thighs.  She bought a home nbUVB machine from [place], as she previously had success with a Rx home light unit, but she felt that this new light made her vitiligo worse even though she researched that it is the same type of bulb.      Vitiligo  - Follow-up  Symptom course: worsening  Affected locations: diffuse  Signs / symptoms: asymptomatic and spreading (discoloration)  Severity: moderate (seems to worsen with stress)      Review of Systems   Musculoskeletal: Negative for joint swelling and arthralgias.   Skin: Negative for itching and recent sunburn.   Psychiatric/Behavioral: Positive for high stress.   Hematologic/Lymphatic: Does not bruise/bleed easily.        Objective:    Physical Exam   Constitutional: She appears well-developed and well-nourished. No distress.   HENT:   Mouth/Throat: Lips normal.    Eyes: Abnormal Lids.   (hypopigmented patch on left lower lid)No conjunctival no injection.   Neurological: She is alert and oriented to person, place, and time. She is not disoriented.   Psychiatric: She has a normal mood and affect.   Skin:   Areas Examined (abnormalities noted in diagram):   Scalp / Hair Palpated and Inspected  Head / Face Inspection Performed  Neck Inspection Performed  Chest / Axilla Inspection Performed  Back Inspection Performed  RUE Inspected  LUE Inspection Performed  RLE Inspected  LLE Inspection Performed  Nails and Digits Inspection Performed                   Diagram Legend     Erythematous scaling macule/papule c/w actinic keratosis       Vascular papule c/w angioma      Pigmented  verrucoid papule/plaque c/w seborrheic keratosis      Yellow umbilicated papule c/w sebaceous hyperplasia      Irregularly shaped tan macule c/w lentigo     1-2 mm smooth white papules consistent with Milia      Movable subcutaneous cyst with punctum c/w epidermal inclusion cyst      Subcutaneous movable cyst c/w pilar cyst      Firm pink to brown papule c/w dermatofibroma      Pedunculated fleshy papule(s) c/w skin tag(s)      Evenly pigmented macule c/w junctional nevus     Mildly variegated pigmented, slightly irregular-bordered macule c/w mildly atypical nevus      Flesh colored to evenly pigmented papule c/w intradermal nevus       Pink pearly papule/plaque c/w basal cell carcinoma      Erythematous hyperkeratotic cursted plaque c/w SCC      Surgical scar with no sign of skin cancer recurrence      Open and closed comedones      Inflammatory papules and pustules      Verrucoid papule consistent consistent with wart     Erythematous eczematous patches and plaques     Dystrophic onycholytic nail with subungual debris c/w onychomycosis     Umbilicated papule    Erythematous-base heme-crusted tan verrucoid plaque consistent with inflamed seborrheic keratosis     Erythematous Silvery Scaling Plaque c/w Psoriasis     See annotation      Assessment / Plan:        Vitiligo  Discussed diagnosis, prognosis, and treatment options for vitiligo. Due to the recent worsening, will be aggressive in treatment in an attempt to halt progression. Will start oral steroids and nbUVB treatment. Rec'd that she use the topicals at least at night even if greasy-feeling, omega on active areas.  -     dexamethasone (DECADRON) 4 MG Tab; Take 1 tab PO with breakfast on Saturdays and Sundays x 8 weeks.  Dispense: 16 tablet; Refill: 0  -     NB-UVB Light Therapy; Standing    Vitamin D deficiency  -     Vitamin D; Future    Abnormal imaging of thyroid  -  Will check thyroid levels due to recent worsening of vitiligo and known history of thyroid  nodule. Pt continues to refuse removal of thyroid nodule.  -     TSH; Future; Expected date: 01/31/2019  -     T4, free; Future; Expected date: 01/31/2019    Follow-up in about 3 months (around 4/30/2019).

## 2019-02-01 ENCOUNTER — LAB VISIT (OUTPATIENT)
Dept: LAB | Facility: HOSPITAL | Age: 34
End: 2019-02-01
Attending: DERMATOLOGY
Payer: COMMERCIAL

## 2019-02-01 DIAGNOSIS — R93.89 ABNORMAL IMAGING OF THYROID: ICD-10-CM

## 2019-02-01 DIAGNOSIS — E55.9 VITAMIN D DEFICIENCY: ICD-10-CM

## 2019-02-01 DIAGNOSIS — L80 VITILIGO: ICD-10-CM

## 2019-02-01 LAB
25(OH)D3+25(OH)D2 SERPL-MCNC: 47 NG/ML
T4 FREE SERPL-MCNC: 0.93 NG/DL
TSH SERPL DL<=0.005 MIU/L-ACNC: 0.51 UIU/ML

## 2019-02-01 PROCEDURE — 84439 ASSAY OF FREE THYROXINE: CPT

## 2019-02-01 PROCEDURE — 84443 ASSAY THYROID STIM HORMONE: CPT

## 2019-02-01 PROCEDURE — 36415 COLL VENOUS BLD VENIPUNCTURE: CPT | Mod: PO

## 2019-02-01 PROCEDURE — 82306 VITAMIN D 25 HYDROXY: CPT

## 2019-02-06 ENCOUNTER — TELEPHONE (OUTPATIENT)
Dept: DERMATOLOGY | Facility: CLINIC | Age: 34
End: 2019-02-06

## 2019-02-26 ENCOUNTER — TELEPHONE (OUTPATIENT)
Dept: DERMATOLOGY | Facility: CLINIC | Age: 34
End: 2019-02-26

## 2019-02-26 NOTE — TELEPHONE ENCOUNTER
Spoke with patient about light treatment frequency, and gave CPT and diagnosis code. Patient will call ins and get back to me to schedule.

## 2019-03-13 ENCOUNTER — PATIENT MESSAGE (OUTPATIENT)
Dept: DERMATOLOGY | Facility: CLINIC | Age: 34
End: 2019-03-13

## 2019-12-30 ENCOUNTER — PATIENT MESSAGE (OUTPATIENT)
Dept: DERMATOLOGY | Facility: CLINIC | Age: 34
End: 2019-12-30

## 2020-01-06 ENCOUNTER — OFFICE VISIT (OUTPATIENT)
Dept: DERMATOLOGY | Facility: CLINIC | Age: 35
End: 2020-01-06
Payer: COMMERCIAL

## 2020-01-06 DIAGNOSIS — L80 VITILIGO: Primary | ICD-10-CM

## 2020-01-06 PROCEDURE — 99214 PR OFFICE/OUTPT VISIT, EST, LEVL IV, 30-39 MIN: ICD-10-PCS | Mod: S$GLB,,, | Performed by: DERMATOLOGY

## 2020-01-06 PROCEDURE — 99214 OFFICE O/P EST MOD 30 MIN: CPT | Mod: S$GLB,,, | Performed by: DERMATOLOGY

## 2020-01-06 RX ORDER — FLUOCINONIDE 0.5 MG/G
CREAM TOPICAL
Qty: 60 G | Refills: 2 | Status: SHIPPED | OUTPATIENT
Start: 2020-01-06 | End: 2020-03-16 | Stop reason: SDUPTHER

## 2020-01-06 RX ORDER — DEXAMETHASONE 4 MG/1
TABLET ORAL
Qty: 16 TABLET | Refills: 0 | Status: SHIPPED | OUTPATIENT
Start: 2020-01-06 | End: 2020-03-10 | Stop reason: SDUPTHER

## 2020-01-06 RX ORDER — TACROLIMUS 1 MG/G
OINTMENT TOPICAL
Qty: 60 G | Refills: 5 | Status: SHIPPED | OUTPATIENT
Start: 2020-01-06 | End: 2021-06-28 | Stop reason: SDUPTHER

## 2020-01-06 NOTE — PROGRESS NOTES
Subjective:       Patient ID:  Puja Hernandez is a 34 y.o. female who presents for   Chief Complaint   Patient presents with    Vitiligo     Vitiligo  - Follow-up  Symptom course: worsening  Currently using: nothing.  Affected locations: face, neck, left elbow, right elbow, groin and chest  Signs / symptoms: spreading (rapidly)  Severity: moderate      Review of Systems   Musculoskeletal: Negative for joint swelling and arthralgias.   Skin: Negative for recent sunburn.   Hematologic/Lymphatic: Does not bruise/bleed easily.        Objective:    Physical Exam   Constitutional: She appears well-developed and well-nourished. No distress.   HENT:   Mouth/Throat: Lips normal.    Eyes: Abnormal Lids.   (hypopigmented patch on left lower lid)No conjunctival no injection.   Neurological: She is alert and oriented to person, place, and time. She is not disoriented.   Psychiatric: She has a normal mood and affect.   Skin:   Areas Examined (abnormalities noted in diagram):   Scalp / Hair Palpated and Inspected  Head / Face Inspection Performed  Neck Inspection Performed  Chest / Axilla Inspection Performed  Back Inspection Performed  RUE Inspected  LUE Inspection Performed  RLE Inspected  LLE Inspection Performed  Nails and Digits Inspection Performed                   Diagram Legend     Erythematous scaling macule/papule c/w actinic keratosis       Vascular papule c/w angioma      Pigmented verrucoid papule/plaque c/w seborrheic keratosis      Yellow umbilicated papule c/w sebaceous hyperplasia      Irregularly shaped tan macule c/w lentigo     1-2 mm smooth white papules consistent with Milia      Movable subcutaneous cyst with punctum c/w epidermal inclusion cyst      Subcutaneous movable cyst c/w pilar cyst      Firm pink to brown papule c/w dermatofibroma      Pedunculated fleshy papule(s) c/w skin tag(s)      Evenly pigmented macule c/w junctional nevus     Mildly variegated pigmented, slightly irregular-bordered  macule c/w mildly atypical nevus      Flesh colored to evenly pigmented papule c/w intradermal nevus       Pink pearly papule/plaque c/w basal cell carcinoma      Erythematous hyperkeratotic cursted plaque c/w SCC      Surgical scar with no sign of skin cancer recurrence      Open and closed comedones      Inflammatory papules and pustules      Verrucoid papule consistent consistent with wart     Erythematous eczematous patches and plaques     Dystrophic onycholytic nail with subungual debris c/w onychomycosis     Umbilicated papule    Erythematous-base heme-crusted tan verrucoid plaque consistent with inflamed seborrheic keratosis     Erythematous Silvery Scaling Plaque c/w Psoriasis     See annotation      Assessment / Plan:        Vitiligo  -     tacrolimus (PROTOPIC) 0.1 % ointment; Apply to affected areas of face and body BID prn vitiligo.  Dispense: 60 g; Refill: 5  -     dexAMETHasone (DECADRON) 4 MG Tab; Take 1 tab PO with breakfast on Saturdays and Sundays x 8 weeks.  Dispense: 16 tablet; Refill: 0  -     fluocinonide 0.05% (LIDEX) 0.05 % cream; Apply to affected areas of body daily-BID x 1 week on, then 1 week off.  Dispense: 60 g; Refill: 2  -     NB-UVB Light Therapy; Standing  -     tofacitinib 2 % cream; Apply to affected areas of face and body BID prn vitiligo.  Dispense: 30 g; Refill: 5  Will send in Rx for a home light unit as patient has had success with light in the past.    Follow up in about 2 months (around 3/6/2020).

## 2020-01-07 ENCOUNTER — TELEPHONE (OUTPATIENT)
Dept: DERMATOLOGY | Facility: CLINIC | Age: 35
End: 2020-01-07

## 2020-01-07 NOTE — TELEPHONE ENCOUNTER
LM to rerun my call to sched light tx. ----- Message from Rebekah Joyce MD sent at 1/6/2020  2:07 PM CST -----  Please set up for nbUVB therapy tx for vitiligo.  Thanks,  KK

## 2020-01-19 ENCOUNTER — PATIENT MESSAGE (OUTPATIENT)
Dept: DERMATOLOGY | Facility: CLINIC | Age: 35
End: 2020-01-19

## 2020-01-20 ENCOUNTER — CLINICAL SUPPORT (OUTPATIENT)
Dept: DERMATOLOGY | Facility: CLINIC | Age: 35
End: 2020-01-20
Payer: COMMERCIAL

## 2020-01-20 DIAGNOSIS — L80 VITILIGO: ICD-10-CM

## 2020-01-20 PROCEDURE — 96900 PR ULTRAVIOLET LIGHT THERAPY: ICD-10-PCS | Mod: S$GLB,,, | Performed by: DERMATOLOGY

## 2020-01-20 PROCEDURE — 99999 PR PBB SHADOW E&M-EST. PATIENT-LVL II: CPT | Mod: PBBFAC,,,

## 2020-01-20 PROCEDURE — 99999 PR PBB SHADOW E&M-EST. PATIENT-LVL II: ICD-10-PCS | Mod: PBBFAC,,,

## 2020-01-20 PROCEDURE — 96900 ACTINOTHERAPY UV LIGHT: CPT | Mod: S$GLB,,, | Performed by: DERMATOLOGY

## 2020-01-20 NOTE — PROGRESS NOTES
Light tx 1 for vitiligo. NB-UVB to body at 200 mj with eyes shielded. Goggles issued and unit safety discussed.

## 2020-01-22 ENCOUNTER — CLINICAL SUPPORT (OUTPATIENT)
Dept: DERMATOLOGY | Facility: CLINIC | Age: 35
End: 2020-01-22
Payer: COMMERCIAL

## 2020-01-22 DIAGNOSIS — L80 VITILIGO: ICD-10-CM

## 2020-01-22 PROCEDURE — 96900 ACTINOTHERAPY UV LIGHT: CPT | Mod: S$GLB,,, | Performed by: DERMATOLOGY

## 2020-01-22 PROCEDURE — 96900 PR ULTRAVIOLET LIGHT THERAPY: ICD-10-PCS | Mod: S$GLB,,, | Performed by: DERMATOLOGY

## 2020-01-22 PROCEDURE — 99999 PR PBB SHADOW E&M-EST. PATIENT-LVL II: CPT | Mod: PBBFAC,,,

## 2020-01-22 PROCEDURE — 99999 PR PBB SHADOW E&M-EST. PATIENT-LVL II: ICD-10-PCS | Mod: PBBFAC,,,

## 2020-01-23 ENCOUNTER — PATIENT MESSAGE (OUTPATIENT)
Dept: DERMATOLOGY | Facility: CLINIC | Age: 35
End: 2020-01-23

## 2020-01-27 ENCOUNTER — CLINICAL SUPPORT (OUTPATIENT)
Dept: DERMATOLOGY | Facility: CLINIC | Age: 35
End: 2020-01-27
Payer: COMMERCIAL

## 2020-01-27 DIAGNOSIS — L80 VITILIGO: ICD-10-CM

## 2020-01-27 PROCEDURE — 99999 PR PBB SHADOW E&M-EST. PATIENT-LVL II: ICD-10-PCS | Mod: PBBFAC,,,

## 2020-01-27 PROCEDURE — 96900 PR ULTRAVIOLET LIGHT THERAPY: ICD-10-PCS | Mod: S$GLB,,, | Performed by: DERMATOLOGY

## 2020-01-27 PROCEDURE — 96900 ACTINOTHERAPY UV LIGHT: CPT | Mod: S$GLB,,, | Performed by: DERMATOLOGY

## 2020-01-27 PROCEDURE — 99999 PR PBB SHADOW E&M-EST. PATIENT-LVL II: CPT | Mod: PBBFAC,,,

## 2020-01-27 NOTE — PROGRESS NOTES
Light tx 3 for vitiligo. NB-UVB to body at 300 mj with eyelids exposed. No erythema or discomfort with last dose. Patient started her Protopic 2 days ago.

## 2020-01-29 ENCOUNTER — CLINICAL SUPPORT (OUTPATIENT)
Dept: DERMATOLOGY | Facility: CLINIC | Age: 35
End: 2020-01-29
Payer: COMMERCIAL

## 2020-01-29 DIAGNOSIS — L80 VITILIGO: ICD-10-CM

## 2020-01-29 PROCEDURE — 99999 PR PBB SHADOW E&M-EST. PATIENT-LVL II: CPT | Mod: PBBFAC,,,

## 2020-01-29 PROCEDURE — 96900 PR ULTRAVIOLET LIGHT THERAPY: ICD-10-PCS | Mod: S$GLB,,, | Performed by: DERMATOLOGY

## 2020-01-29 PROCEDURE — 99999 PR PBB SHADOW E&M-EST. PATIENT-LVL II: ICD-10-PCS | Mod: PBBFAC,,,

## 2020-01-29 PROCEDURE — 96900 ACTINOTHERAPY UV LIGHT: CPT | Mod: S$GLB,,, | Performed by: DERMATOLOGY

## 2020-01-29 NOTE — PROGRESS NOTES
Light tx 4 for vitiligo.l NB-UVB to body at 350 mj with eyes shielded. No erythema with last dose.

## 2020-02-03 ENCOUNTER — CLINICAL SUPPORT (OUTPATIENT)
Dept: DERMATOLOGY | Facility: CLINIC | Age: 35
End: 2020-02-03
Payer: COMMERCIAL

## 2020-02-03 DIAGNOSIS — L80 VITILIGO: ICD-10-CM

## 2020-02-03 PROCEDURE — 99999 PR PBB SHADOW E&M-EST. PATIENT-LVL II: CPT | Mod: PBBFAC,,,

## 2020-02-03 PROCEDURE — 99999 PR PBB SHADOW E&M-EST. PATIENT-LVL II: ICD-10-PCS | Mod: PBBFAC,,,

## 2020-02-03 PROCEDURE — 96900 PR ULTRAVIOLET LIGHT THERAPY: ICD-10-PCS | Mod: S$GLB,,, | Performed by: DERMATOLOGY

## 2020-02-03 PROCEDURE — 96900 ACTINOTHERAPY UV LIGHT: CPT | Mod: S$GLB,,, | Performed by: DERMATOLOGY

## 2020-02-05 ENCOUNTER — CLINICAL SUPPORT (OUTPATIENT)
Dept: DERMATOLOGY | Facility: CLINIC | Age: 35
End: 2020-02-05
Payer: COMMERCIAL

## 2020-02-05 DIAGNOSIS — L80 VITILIGO: ICD-10-CM

## 2020-02-05 PROCEDURE — 96900 ACTINOTHERAPY UV LIGHT: CPT | Mod: S$GLB,,, | Performed by: DERMATOLOGY

## 2020-02-05 PROCEDURE — 99999 PR PBB SHADOW E&M-EST. PATIENT-LVL II: CPT | Mod: PBBFAC,,,

## 2020-02-05 PROCEDURE — 96900 PR ULTRAVIOLET LIGHT THERAPY: ICD-10-PCS | Mod: S$GLB,,, | Performed by: DERMATOLOGY

## 2020-02-05 PROCEDURE — 99999 PR PBB SHADOW E&M-EST. PATIENT-LVL II: ICD-10-PCS | Mod: PBBFAC,,,

## 2020-02-10 ENCOUNTER — CLINICAL SUPPORT (OUTPATIENT)
Dept: DERMATOLOGY | Facility: CLINIC | Age: 35
End: 2020-02-10
Payer: COMMERCIAL

## 2020-02-10 DIAGNOSIS — L80 VITILIGO: ICD-10-CM

## 2020-02-10 PROCEDURE — 99999 PR PBB SHADOW E&M-EST. PATIENT-LVL II: CPT | Mod: PBBFAC,,,

## 2020-02-10 PROCEDURE — 99999 PR PBB SHADOW E&M-EST. PATIENT-LVL II: ICD-10-PCS | Mod: PBBFAC,,,

## 2020-02-10 PROCEDURE — 96900 PR ULTRAVIOLET LIGHT THERAPY: ICD-10-PCS | Mod: S$GLB,,, | Performed by: DERMATOLOGY

## 2020-02-10 PROCEDURE — 96900 ACTINOTHERAPY UV LIGHT: CPT | Mod: S$GLB,,, | Performed by: DERMATOLOGY

## 2020-02-17 ENCOUNTER — CLINICAL SUPPORT (OUTPATIENT)
Dept: DERMATOLOGY | Facility: CLINIC | Age: 35
End: 2020-02-17
Payer: COMMERCIAL

## 2020-02-17 DIAGNOSIS — L80 VITILIGO: ICD-10-CM

## 2020-02-17 PROCEDURE — 99999 PR PBB SHADOW E&M-EST. PATIENT-LVL II: ICD-10-PCS | Mod: PBBFAC,,,

## 2020-02-17 PROCEDURE — 99999 PR PBB SHADOW E&M-EST. PATIENT-LVL II: CPT | Mod: PBBFAC,,,

## 2020-02-17 PROCEDURE — 96900 PR ULTRAVIOLET LIGHT THERAPY: ICD-10-PCS | Mod: S$GLB,,, | Performed by: DERMATOLOGY

## 2020-02-17 PROCEDURE — 96900 ACTINOTHERAPY UV LIGHT: CPT | Mod: S$GLB,,, | Performed by: DERMATOLOGY

## 2020-02-19 ENCOUNTER — CLINICAL SUPPORT (OUTPATIENT)
Dept: DERMATOLOGY | Facility: CLINIC | Age: 35
End: 2020-02-19
Payer: COMMERCIAL

## 2020-02-19 DIAGNOSIS — L80 VITILIGO: ICD-10-CM

## 2020-02-19 PROCEDURE — 96900 ACTINOTHERAPY UV LIGHT: CPT | Mod: S$GLB,,, | Performed by: DERMATOLOGY

## 2020-02-19 PROCEDURE — 99999 PR PBB SHADOW E&M-EST. PATIENT-LVL I: ICD-10-PCS | Mod: PBBFAC,,,

## 2020-02-19 PROCEDURE — 96900 PR ULTRAVIOLET LIGHT THERAPY: ICD-10-PCS | Mod: S$GLB,,, | Performed by: DERMATOLOGY

## 2020-02-19 PROCEDURE — 99999 PR PBB SHADOW E&M-EST. PATIENT-LVL I: CPT | Mod: PBBFAC,,,

## 2020-02-19 NOTE — PROGRESS NOTES
Light tx 9 for vitiligo. NB-UVB to body at 600 mj with eyelids exposed. Patient has decided to d/c light , waiting on a home unit.

## 2020-02-26 ENCOUNTER — PATIENT MESSAGE (OUTPATIENT)
Dept: DERMATOLOGY | Facility: CLINIC | Age: 35
End: 2020-02-26

## 2020-03-10 ENCOUNTER — OFFICE VISIT (OUTPATIENT)
Dept: DERMATOLOGY | Facility: CLINIC | Age: 35
End: 2020-03-10
Payer: COMMERCIAL

## 2020-03-10 DIAGNOSIS — L80 VITILIGO: ICD-10-CM

## 2020-03-10 PROCEDURE — 99213 OFFICE O/P EST LOW 20 MIN: CPT | Mod: S$GLB,,, | Performed by: DERMATOLOGY

## 2020-03-10 PROCEDURE — 99213 PR OFFICE/OUTPT VISIT, EST, LEVL III, 20-29 MIN: ICD-10-PCS | Mod: S$GLB,,, | Performed by: DERMATOLOGY

## 2020-03-10 RX ORDER — DEXAMETHASONE 4 MG/1
TABLET ORAL
Qty: 8 TABLET | Refills: 0 | Status: SHIPPED | OUTPATIENT
Start: 2020-03-10 | End: 2021-06-28 | Stop reason: ALTCHOICE

## 2020-03-10 NOTE — PROGRESS NOTES
Subjective:       Patient ID:  Puja Hernandez is a 35 y.o. female who presents for   Chief Complaint   Patient presents with    Vitiligo     diffsue     Vitiligo  - Follow-up  Symptom course: worsening  Currently using: last nbUVB tx was last month; took dexamethasone pulse dosing x 8 weeks; tacrolimus on face, lidex on body.  Affected locations: diffuse  SIGNS AND SYMPTOMS F/U: hypopigmentation.  Severity: moderate to severe      Review of Systems   Skin: Negative for itching and rash.   Hematologic/Lymphatic: Does not bruise/bleed easily.        Objective:    Physical Exam   Constitutional: She appears well-developed and well-nourished. No distress.   HENT:   Mouth/Throat: Lips normal.    Eyes: Lids are normal.  No conjunctival no injection.   Neurological: She is alert and oriented to person, place, and time. She is not disoriented.   Psychiatric: She has a normal mood and affect.   Skin:   Areas Examined (abnormalities noted in diagram):   Scalp / Hair Palpated and Inspected  Head / Face Inspection Performed  Neck Inspection Performed  Chest / Axilla Inspection Performed  Abdomen Inspection Performed  Back Inspection Performed  RUE Inspected  LUE Inspection Performed  Nails and Digits Inspection Performed                   Diagram Legend     Erythematous scaling macule/papule c/w actinic keratosis       Vascular papule c/w angioma      Pigmented verrucoid papule/plaque c/w seborrheic keratosis      Yellow umbilicated papule c/w sebaceous hyperplasia      Irregularly shaped tan macule c/w lentigo     1-2 mm smooth white papules consistent with Milia      Movable subcutaneous cyst with punctum c/w epidermal inclusion cyst      Subcutaneous movable cyst c/w pilar cyst      Firm pink to brown papule c/w dermatofibroma      Pedunculated fleshy papule(s) c/w skin tag(s)      Evenly pigmented macule c/w junctional nevus     Mildly variegated pigmented, slightly irregular-bordered macule c/w mildly atypical nevus       Flesh colored to evenly pigmented papule c/w intradermal nevus       Pink pearly papule/plaque c/w basal cell carcinoma      Erythematous hyperkeratotic cursted plaque c/w SCC      Surgical scar with no sign of skin cancer recurrence      Open and closed comedones      Inflammatory papules and pustules      Verrucoid papule consistent consistent with wart     Erythematous eczematous patches and plaques     Dystrophic onycholytic nail with subungual debris c/w onychomycosis     Umbilicated papule    Erythematous-base heme-crusted tan verrucoid plaque consistent with inflamed seborrheic keratosis     Erythematous Silvery Scaling Plaque c/w Psoriasis     See annotation      Assessment / Plan:        Vitiligo  Slight improvement, but still in need of active treatment as problem remains unstable.   Will continue pulse dose decadron x 4 more weeks.  -     dexAMETHasone (DECADRON) 4 MG Tab; Take 1 tab PO with breakfast on Saturdays and Sundays x 4 weeks.  Dispense: 8 tablet; Refill: 0  Her home nbUVB unit will be delivered shortly. She also learned that her in-office light treatments were covered by her insurance, so she will touch base with [facility] to restart light.  Continue topical lidex and protopic.    Follow up in about 3 months (around 6/10/2020).

## 2020-03-16 DIAGNOSIS — L80 VITILIGO: ICD-10-CM

## 2020-03-16 DIAGNOSIS — L70.0 ACNE VULGARIS: ICD-10-CM

## 2020-03-16 RX ORDER — FLUOCINONIDE 0.5 MG/G
CREAM TOPICAL
Qty: 60 G | Refills: 2 | Status: SHIPPED | OUTPATIENT
Start: 2020-03-16 | End: 2020-05-11 | Stop reason: SDUPTHER

## 2020-03-16 RX ORDER — CLINDAMYCIN PHOSPHATE 10 MG/G
GEL TOPICAL
Qty: 60 G | Refills: 3 | Status: SHIPPED | OUTPATIENT
Start: 2020-03-16 | End: 2020-03-20 | Stop reason: SDUPTHER

## 2020-03-20 ENCOUNTER — PATIENT MESSAGE (OUTPATIENT)
Dept: DERMATOLOGY | Facility: CLINIC | Age: 35
End: 2020-03-20

## 2020-03-20 DIAGNOSIS — L70.0 ACNE VULGARIS: ICD-10-CM

## 2020-03-20 RX ORDER — CLINDAMYCIN PHOSPHATE 10 MG/G
GEL TOPICAL
Qty: 60 G | Refills: 3 | Status: SHIPPED | OUTPATIENT
Start: 2020-03-20 | End: 2021-06-28 | Stop reason: SDUPTHER

## 2020-05-11 ENCOUNTER — PATIENT MESSAGE (OUTPATIENT)
Dept: DERMATOLOGY | Facility: CLINIC | Age: 35
End: 2020-05-11

## 2020-05-11 DIAGNOSIS — L80 VITILIGO: ICD-10-CM

## 2020-05-11 RX ORDER — FLUOCINONIDE 0.5 MG/G
CREAM TOPICAL
Qty: 60 G | Refills: 2 | Status: SHIPPED | OUTPATIENT
Start: 2020-05-11 | End: 2020-08-20 | Stop reason: SDUPTHER

## 2020-05-12 ENCOUNTER — TELEPHONE (OUTPATIENT)
Dept: DERMATOLOGY | Facility: CLINIC | Age: 35
End: 2020-05-12

## 2020-08-19 ENCOUNTER — PATIENT MESSAGE (OUTPATIENT)
Dept: DERMATOLOGY | Facility: CLINIC | Age: 35
End: 2020-08-19

## 2020-08-19 DIAGNOSIS — L80 VITILIGO: Primary | ICD-10-CM

## 2020-08-19 DIAGNOSIS — L80 VITILIGO: ICD-10-CM

## 2020-08-20 RX ORDER — TACROLIMUS 1 MG/G
OINTMENT TOPICAL
Qty: 100 G | Refills: 5 | Status: SHIPPED | OUTPATIENT
Start: 2020-08-20 | End: 2022-10-03 | Stop reason: SDUPTHER

## 2020-08-20 RX ORDER — TACROLIMUS 1 MG/G
OINTMENT TOPICAL
Qty: 60 G | Refills: 5 | OUTPATIENT
Start: 2020-08-20

## 2020-08-20 RX ORDER — FLUOCINONIDE 0.5 MG/G
CREAM TOPICAL
Qty: 60 G | Refills: 2 | OUTPATIENT
Start: 2020-08-20

## 2020-10-05 ENCOUNTER — PATIENT MESSAGE (OUTPATIENT)
Dept: ADMINISTRATIVE | Facility: HOSPITAL | Age: 35
End: 2020-10-05

## 2020-10-12 ENCOUNTER — PATIENT MESSAGE (OUTPATIENT)
Dept: INTERNAL MEDICINE | Facility: CLINIC | Age: 35
End: 2020-10-12

## 2020-10-12 DIAGNOSIS — E04.9 ENLARGED THYROID: Primary | ICD-10-CM

## 2020-10-13 ENCOUNTER — PATIENT MESSAGE (OUTPATIENT)
Dept: ENDOCRINOLOGY | Facility: CLINIC | Age: 35
End: 2020-10-13

## 2020-10-13 ENCOUNTER — LAB VISIT (OUTPATIENT)
Dept: LAB | Facility: HOSPITAL | Age: 35
End: 2020-10-13
Attending: NURSE PRACTITIONER
Payer: COMMERCIAL

## 2020-10-13 ENCOUNTER — OFFICE VISIT (OUTPATIENT)
Dept: ENDOCRINOLOGY | Facility: CLINIC | Age: 35
End: 2020-10-13
Payer: COMMERCIAL

## 2020-10-13 ENCOUNTER — OFFICE VISIT (OUTPATIENT)
Dept: OBSTETRICS AND GYNECOLOGY | Facility: CLINIC | Age: 35
End: 2020-10-13
Payer: COMMERCIAL

## 2020-10-13 VITALS
DIASTOLIC BLOOD PRESSURE: 68 MMHG | WEIGHT: 151.25 LBS | SYSTOLIC BLOOD PRESSURE: 110 MMHG | BODY MASS INDEX: 24.41 KG/M2

## 2020-10-13 DIAGNOSIS — N92.0 MENORRHAGIA WITH REGULAR CYCLE: ICD-10-CM

## 2020-10-13 DIAGNOSIS — E04.1 THYROID NODULE: Primary | ICD-10-CM

## 2020-10-13 DIAGNOSIS — Z11.3 SCREEN FOR STD (SEXUALLY TRANSMITTED DISEASE): ICD-10-CM

## 2020-10-13 DIAGNOSIS — L80 VITILIGO: ICD-10-CM

## 2020-10-13 DIAGNOSIS — E04.1 THYROID NODULE: ICD-10-CM

## 2020-10-13 DIAGNOSIS — Z01.419 ENCOUNTER FOR ANNUAL ROUTINE GYNECOLOGICAL EXAMINATION: Primary | ICD-10-CM

## 2020-10-13 DIAGNOSIS — N63.0 BREAST MASS: ICD-10-CM

## 2020-10-13 DIAGNOSIS — Z12.4 SCREENING FOR MALIGNANT NEOPLASM OF CERVIX: ICD-10-CM

## 2020-10-13 LAB
BASOPHILS # BLD AUTO: 0.01 K/UL (ref 0–0.2)
BASOPHILS NFR BLD: 0.3 % (ref 0–1.9)
DIFFERENTIAL METHOD: ABNORMAL
EOSINOPHIL # BLD AUTO: 0 K/UL (ref 0–0.5)
EOSINOPHIL NFR BLD: 1.1 % (ref 0–8)
ERYTHROCYTE [DISTWIDTH] IN BLOOD BY AUTOMATED COUNT: 12.5 % (ref 11.5–14.5)
HCT VFR BLD AUTO: 37.4 % (ref 37–48.5)
HGB BLD-MCNC: 11.8 G/DL (ref 12–16)
IMM GRANULOCYTES # BLD AUTO: 0 K/UL (ref 0–0.04)
IMM GRANULOCYTES NFR BLD AUTO: 0 % (ref 0–0.5)
LYMPHOCYTES # BLD AUTO: 1.3 K/UL (ref 1–4.8)
LYMPHOCYTES NFR BLD: 36.8 % (ref 18–48)
MCH RBC QN AUTO: 28.1 PG (ref 27–31)
MCHC RBC AUTO-ENTMCNC: 31.6 G/DL (ref 32–36)
MCV RBC AUTO: 89 FL (ref 82–98)
MONOCYTES # BLD AUTO: 0.4 K/UL (ref 0.3–1)
MONOCYTES NFR BLD: 10 % (ref 4–15)
NEUTROPHILS # BLD AUTO: 1.8 K/UL (ref 1.8–7.7)
NEUTROPHILS NFR BLD: 51.8 % (ref 38–73)
NRBC BLD-RTO: 0 /100 WBC
PLATELET # BLD AUTO: 148 K/UL (ref 150–350)
PMV BLD AUTO: 12.6 FL (ref 9.2–12.9)
RBC # BLD AUTO: 4.2 M/UL (ref 4–5.4)
WBC # BLD AUTO: 3.51 K/UL (ref 3.9–12.7)

## 2020-10-13 PROCEDURE — 99999 PR PBB SHADOW E&M-EST. PATIENT-LVL III: ICD-10-PCS | Mod: PBBFAC,,, | Performed by: NURSE PRACTITIONER

## 2020-10-13 PROCEDURE — 85025 COMPLETE CBC W/AUTO DIFF WBC: CPT

## 2020-10-13 PROCEDURE — 99999 PR PBB SHADOW E&M-EST. PATIENT-LVL III: CPT | Mod: PBBFAC,,, | Performed by: NURSE PRACTITIONER

## 2020-10-13 PROCEDURE — 84443 ASSAY THYROID STIM HORMONE: CPT

## 2020-10-13 PROCEDURE — 88175 CYTOPATH C/V AUTO FLUID REDO: CPT

## 2020-10-13 PROCEDURE — 99385 PREV VISIT NEW AGE 18-39: CPT | Mod: S$GLB,,, | Performed by: NURSE PRACTITIONER

## 2020-10-13 PROCEDURE — 36415 COLL VENOUS BLD VENIPUNCTURE: CPT | Mod: PN

## 2020-10-13 PROCEDURE — 99214 PR OFFICE/OUTPT VISIT, EST, LEVL IV, 30-39 MIN: ICD-10-PCS | Mod: 95,,, | Performed by: INTERNAL MEDICINE

## 2020-10-13 PROCEDURE — 99214 OFFICE O/P EST MOD 30 MIN: CPT | Mod: 95,,, | Performed by: INTERNAL MEDICINE

## 2020-10-13 PROCEDURE — 86592 SYPHILIS TEST NON-TREP QUAL: CPT

## 2020-10-13 PROCEDURE — 87624 HPV HI-RISK TYP POOLED RSLT: CPT

## 2020-10-13 PROCEDURE — 99385 PR PREVENTIVE VISIT,NEW,18-39: ICD-10-PCS | Mod: S$GLB,,, | Performed by: NURSE PRACTITIONER

## 2020-10-13 PROCEDURE — 87491 CHLMYD TRACH DNA AMP PROBE: CPT

## 2020-10-13 PROCEDURE — 80074 ACUTE HEPATITIS PANEL: CPT

## 2020-10-13 PROCEDURE — 86703 HIV-1/HIV-2 1 RESULT ANTBDY: CPT

## 2020-10-13 RX ORDER — ADAPALENE 1 MG/G
GEL TOPICAL
COMMUNITY
Start: 2019-10-12 | End: 2022-03-28

## 2020-10-13 NOTE — PROGRESS NOTES
"    Subjective:    Patient ID:  Puja Hernandez is a 35 y.o. female.    Chief Complaint:  Goiter      Pt presents to follow up . Pt previously seen by  Dr. Mejia but is new to me.      Other PMH vitiligo.    Visit conducted over telemedicine. Start time 2:15 pm. End time 2:42 pm.  The patient location is: [place], [place]    Visit type: audiovisual    Face to Face time with patient: 27 minutes  28 minutes of total time spent on the encounter, which includes face to face time and non-face to face time preparing to see the patient (eg, review of tests), Obtaining and/or reviewing separately obtained history, Documenting clinical information in the electronic or other health record, Independently interpreting results (not separately reported) and communicating results to the patient/family/caregiver, or Care coordination (not separately reported).     Each patient to whom he or she provides medical services by telemedicine is:  (1) informed of the relationship between the physician and patient and the respective role of any other health care provider with respect to management of the patient; and (2) notified that he or she may decline to receive medical services by telemedicine and may withdraw from such care at any time.    Had previously seen Dr. Marlow in 2011 and Dr. Mejia in 2018.   Has known large left thyroid nodule that grew in size from 2 to 5cm. (from 2011 to 2018 US study).  Had prior fna of left thyroid nodule in 2011 that was benign and 2018 that was benign.   Presents today to follow up. Notes she her neck is growing in size.  No hoarseness, voice changes, dysphagia, compressive symptoms, or head/neck exposure to XRT. No personal or FH of thyroid cancer or MEN syndrome.  Can "feel something in her throat all the time." Dad has Graves. Sister had thyroid cancer. TPO and TSI abs neg in 2018. Last TSH 2019 wnls. Notes some increase fatigue since being laid off the last few weeks. Sometimes naps during " the day and doesn't sleep as much at night. No dizziness or sx's of low blood pressure. Denies weight loss and has some weight gain. No other sx's of an overactive thyroid.      1/2018 thyroid US:  The thyroid is normal in size.    Right lobe: 4.2 x 1.3 x 2.0 cm. 4 mm cystic nodule is TI-RADS category 1.    Left lobe: 6.6 x 2.7 x 3.6 cm.     5.7 cm solid, primarily mildly hypoechoic, wider than tall nodule with mild irregularity of its margins and no echogenic foci is TI-RADS category 4, previously measured 2.1 cm on 5/26/11.    Normal appearing cervical chain lymph nodes are visualized.  Impression        5.7 cm TI-RADS category for nodule of the left thyroid lobe, enlarged from 2.1 cm. FNA is recommended for TI-RADS category 4 nodules measuring 1.5 cm or larger; however, note is made that this nodule reportedly previously underwent FNA in 2011 with benign pathology; clinical considerations will determine the need for additional FNA.     Endorses night sweats, hair loss, tremors.  Denies dysphagia, hoarseness, difficulty breathing     Half sister with thyroid cancer (same father)  Dad has Grave's disease and had thyroid surgery     Works in marketing  No formal exercise         1/3/2018     Narrative & Impression    HISTORY: Large L thyroid nodule s/p benign fna. Sister w/ h/o thyroid CA     TECHNIQUE:  Routine thyroid ultrasound.     COMPARISON: Thyroid ultrasound 5/26/11     FINDINGS:     The thyroid is normal in size.     Right lobe: 4.2 x 1.3 x 2.0 cm.  5.7 cm solid, primarily mildly hypoechoic, wider than tall nodule with mild irregularity of its margins and no echogenic foci is TI-RADS category 4, previously measured 2.1 cm on 5/26/11.     Left lobe: 6.6 x 2.7 x 3.6 cm.  4 mm cystic nodule is TI-RADS category 1.     Normal appearing cervical chain lymph nodes are visualized.  IMPRESSION:      5.7 cm TI-RADS category for nodule of the right thyroid lobe, enlarged from 2.1 cm. FNA is recommended for TI-RADS  category 4 nodules measuring 1.5 cm or larger; however, note is made that this nodule reportedly previously underwent FNA in 2011 with benign pathology; clinical considerations will determine the need for additional FNA.    05/07/2018  [facility] Ultrasound  [id number]  ORDERING PHYSICIAN(S)  [facility]  CLINICAL DIAGNOSIS/INFORMATION  Clinical information: Left thyroid nodule  Gross Description  6 FX SLIDES, 1 CYTOLYT JAR;TH  6 SLIDES, 1 NGTP  5 PASSES (SH/TFS)  SPECIMEN  1) FNA LEFT Thyroid (Clinician)  FINAL PATHOLOGIC              Electronically signed by: ABDOULAYE REYES  Date:                                            01/03/18          Review of Systems   Constitutional: Positive for fatigue. Negative for unexpected weight change.   HENT: Negative for trouble swallowing and voice change.    Eyes: Negative for visual disturbance.   Respiratory: Negative for shortness of breath.    Cardiovascular: Negative for chest pain and palpitations.   Gastrointestinal: Negative for abdominal pain.   Endocrine: Positive for cold intolerance. Negative for heat intolerance.   Musculoskeletal: Negative for myalgias.   Skin: Positive for rash.   Neurological: Positive for tremors. Negative for dizziness, syncope and headaches.   Hematological: Negative for adenopathy.   Psychiatric/Behavioral: Positive for sleep disturbance. The patient is nervous/anxious.         Past Medical History:   Diagnosis Date    Left thyroid nodule     Vitiligo       Social History     Tobacco Use    Smoking status: Never Smoker    Smokeless tobacco: Never Used   Substance Use Topics    Alcohol use: Yes     Comment: socially    Drug use: No     Family History   Problem Relation Age of Onset    Thyroid cancer Sister     Fibroids Mother     Hypertension Mother     Thyroid nodules Mother     Graves' disease Father     Diabetes Maternal Aunt     Hypertension Maternal Grandmother     Ovarian cancer Neg Hx     Melanoma Neg Hx     Breast cancer  Neg Hx     Colon cancer Neg Hx       Past Surgical History:   Procedure Laterality Date    WISDOM TOOTH EXTRACTION            Current Outpatient Medications:     adapalene (DIFFERIN) 0.1 % gel, , Disp: , Rfl:     clindamycin phosphate 1% (CLEOCIN T) 1 % gel, Apply to face BID prn acne, Disp: 60 g, Rfl: 3    dexAMETHasone (DECADRON) 4 MG Tab, Take 1 tab PO with breakfast on Saturdays and Sundays x 4 weeks. (Patient not taking: Reported on 10/13/2020), Disp: 8 tablet, Rfl: 0    fluocinonide 0.05% (LIDEX) 0.05 % cream, Apply to affected areas of body daily-BID x 1 week on, then 1 week off., Disp: 60 g, Rfl: 2    ginkgo biloba 40 mg Tab, Take 1 tablet by mouth Daily., Disp: , Rfl:     tacrolimus (PROTOPIC) 0.1 % ointment, Apply to affected areas of face and body BID prn vitiligo., Disp: 60 g, Rfl: 5    tacrolimus (PROTOPIC) 0.1 % ointment, Apply to affected areas of face and body BID., Disp: 100 g, Rfl: 5    tofacitinib 5 mg Tab 2 %, Apply to affected areas of face and body BID prn vitiligo. (Patient not taking: Reported on 10/13/2020), Disp: 30 g, Rfl: 5     Review of patient's allergies indicates:  No Known Allergies     Objective:   LMP 09/26/2020   BP Readings from Last 3 Encounters:   10/13/20 110/68   04/30/18 110/68   01/02/18 100/68     Wt Readings from Last 3 Encounters:   10/13/20 0908 68.6 kg (151 lb 3.8 oz)   04/30/18 1336 60.5 kg (133 lb 6.1 oz)   01/02/18 1409 63.3 kg (139 lb 8.8 oz)          Physical Exam  Vitals signs reviewed.   Constitutional:       General: She is not in acute distress.     Appearance: Normal appearance. She is well-developed. She is not ill-appearing, toxic-appearing or diaphoretic.   HENT:      Head: Normocephalic and atraumatic.      Right Ear: External ear normal.      Left Ear: External ear normal.   Eyes:      General: No scleral icterus.  Neck:      Trachea: No tracheal deviation.      Comments: +ve thyromegaly on L  Pulmonary:      Effort: Pulmonary effort is normal.  No respiratory distress.   Abdominal:      General: There is no distension.   Neurological:      General: No focal deficit present.      Mental Status: She is alert and oriented to person, place, and time.   Psychiatric:         Thought Content: Thought content normal.           Lab Results   Component Value Date     05/25/2018    K 3.7 05/25/2018     05/25/2018    CO2 24 05/25/2018    BUN 15 05/25/2018    CREATININE 0.9 05/25/2018    GLU 78 05/25/2018    AST 22 05/25/2018    ALT 10 05/25/2018    ALBUMIN 4.5 05/25/2018    PROT 7.9 05/25/2018    BILITOT 1.4 (H) 05/25/2018    WBC 3.63 (L) 05/01/2018    HGB 12.0 05/01/2018    HCT 37.6 05/01/2018    MCV 89 05/01/2018    MCH 28.3 05/01/2018     (L) 05/01/2018    MPV 11.7 05/01/2018    GRAN 1.7 (L) 05/01/2018    GRAN 47.6 05/01/2018    LYMPH 1.6 05/01/2018    LYMPH 43.0 05/01/2018    CHOL 164 05/01/2018    HDL 55 05/01/2018    LDLCALC 94.2 05/01/2018    TRIG 74 05/01/2018       Lab Results   Component Value Date    TSH 0.507 02/01/2019    FREET4 0.93 02/01/2019        Thyroid Labs Latest Ref Rng & Units 5/1/2018 5/25/2018 2/1/2019   TSH 0.400 - 4.000 uIU/mL 0.860 - 0.507   Free T4 0.71 - 1.51 ng/dL 0.94 - 0.93   Sodium 136 - 145 mmol/L - 137 -   Potassium 3.5 - 5.1 mmol/L - 3.7 -   Chloride 95 - 110 mmol/L - 103 -   Carbon Dioxide 23 - 29 mmol/L - 24 -   Glucose 70 - 110 mg/dL - 78 -   Blood Urea Nitrogen 6 - 20 mg/dL - 15 -   Creatinine 0.5 - 1.4 mg/dL - 0.9 -   Calcium 8.7 - 10.5 mg/dL - 9.6 -   Total Protein 6.0 - 8.4 g/dL - 7.9 -   Albumin 3.5 - 5.2 g/dL - 4.5 -   Total Bilirubin 0.1 - 1.0 mg/dL - 1.4(H) -   AST 10 - 40 U/L - 22 -   ALT 10 - 44 U/L - 10 -   Anion Gap 8 - 16 mmol/L - 10 -   eGFR (African American) >60 mL/min/1.73 m:2 - >60.0 -   eGFR (Non-African American) >60 mL/min/1.73 m:2 - >60.0 -   WBC 3.90 - 12.70 K/uL 3.63(L) - -   RBC 4.00 - 5.40 M/uL 4.24 - -   Hemoglobin 12.0 - 16.0 g/dL 12.0 - -   Hematocrit 37.0 - 48.5 % 37.6 - -   MCV  82 - 98 fL 89 - -   MCH 27.0 - 31.0 pg 28.3 - -   MCHC 32.0 - 36.0 g/dL 31.9(L) - -   RDW 11.5 - 14.5 % 12.8 - -   Platelets 150 - 350 K/uL 148(L) - -   MPV 9.2 - 12.9 fL 11.7 - -   Gran # 1.8 - 7.7 K/uL 1.7(L) - -   Lymph # 1.0 - 4.8 K/uL 1.6 - -   Mono # 0.3 - 1.0 K/uL 0.3 - -   Eos # 0.0 - 0.5 K/uL 0.1 - -   Baso # 0.00 - 0.20 K/uL 0.01 - -   Gran % 38.0 - 73.0 % 47.6 - -   Lymph % 18.0 - 48.0 % 43.0 - -   Mono% 4.0 - 15.0 % 7.7 - -   Eos % 0.0 - 8.0 % 1.4 - -   Baso % 0.0 - 1.9 % 0.3 - -   Thyroglobulin, Tumor Marker ng/mL - 198(H) -   Thyroglobulin, Antibody Screen <4.0 IU/mL - <1.8 -   Thyroperoxidase Antibodies <6.0 IU/mL - <6.0 -         No results found for: HGBA1C      Assessment and plan:       Problem List Items Addressed This Visit        Derm    Vitiligo    Current Assessment & Plan     Discussed with patient that vitiligo symptoms are unrelated to her thyroid nodule.  The vitiligo is a result of autoimmune dysfunction.  Discussed how you can have two separate autoimmune disorders such as Graves disease or Hashimoto's along with vitiligo; however, the two are not interdependent.  Follow-up with derm.            Endocrine    Thyroid nodule - Primary    Current Assessment & Plan     Patient with history of left thyroid nodule that has grown over time.  FNA x2 in the past was negative.  Last thyroid ultrasound 2018.  Plan to repeat now.  Discussed with patient that this adenoma will likely continue to grow in the future.  She is interested and surgery however she is interested in the under arm approach.  Will follow-up results of thyroid ultrasound. Patient willing to do repeat FNA if needed. Recheck TSH. Refer to [facility] for US.         Relevant Orders    US Soft Tissue Head Neck Thyroid    TSH       A total of 27 minutes were spent face to face with pt and 28 minutes total discussing the previously mentioned diagnosis, counseling, and coordination of care.     TSH now ([place])  Thyroid  ultrasound on the [facility] in endocrine [facility]

## 2020-10-13 NOTE — PROGRESS NOTES
"CC: Well woman exam    SUBJECTIVE:   Puja Hrenandez is a 35 y.o. female   for annual routine Pap and checkup. Patient's last menstrual period was 2020..  Pt has a h/o of vitiligo and complains of vaginal irritation when she uses the protopic on the areas of depigmentation on her mons and groin area. She reports that she avoids using it on her labia but she feels like it "seeps into her vagina and causes irritation".       She describes her periods as regular, lasting 3-4 days. Very heavy flow. Reports dysmenorrhea requiring ibuprofen. Has been on OCPs from age [age]s but reports that it didn't really help with her periods and she got off of it because she felt like it was making her vitiligo worse.   denies break through bleeding.   complains of vaginal itching or irritation (not currently- see above).  denies vaginal discharge.    She is not currently sexually active. Requests STD testing- full panel.   She uses no method for contraception. Declines contraception at this time (see above).    History of abnormal pap: No  Last Pap: 2017- NILM HPV -  Last MMG: No  Last Colonoscopy:  No    Current Outpatient Medications on File Prior to Visit   Medication Sig Dispense Refill    adapalene (DIFFERIN) 0.1 % gel       clindamycin phosphate 1% (CLEOCIN T) 1 % gel Apply to face BID prn acne 60 g 3    fluocinonide 0.05% (LIDEX) 0.05 % cream Apply to affected areas of body daily-BID x 1 week on, then 1 week off. 60 g 2    ginkgo biloba 40 mg Tab Take 1 tablet by mouth Daily.      tacrolimus (PROTOPIC) 0.1 % ointment Apply to affected areas of face and body BID. 100 g 5    dexAMETHasone (DECADRON) 4 MG Tab Take 1 tab PO with breakfast on  and Sundays x 4 weeks. (Patient not taking: Reported on 10/13/2020) 8 tablet 0    tacrolimus (PROTOPIC) 0.1 % ointment Apply to affected areas of face and body BID prn vitiligo. 60 g 5    tofacitinib 5 mg Tab 2 % Apply to affected areas of face and body BID " prn vitiligo. (Patient not taking: Reported on 10/13/2020) 30 g 5     No current facility-administered medications on file prior to visit.          Past Medical History:   Diagnosis Date    Left thyroid nodule     Vitiligo        Past Surgical History:   Procedure Laterality Date    WISDOM TOOTH EXTRACTION         OB History    Para Term  AB Living   0 0 0 0 0 0   SAB TAB Ectopic Multiple Live Births   0 0 0 0 0       Family History   Problem Relation Age of Onset    Thyroid cancer Sister     Fibroids Mother     Hypertension Mother     Thyroid nodules Mother     Graves' disease Father     Diabetes Maternal Aunt     Hypertension Maternal Grandmother     Ovarian cancer Neg Hx     Melanoma Neg Hx     Breast cancer Neg Hx     Colon cancer Neg Hx        Social History     Tobacco Use    Smoking status: Never Smoker    Smokeless tobacco: Never Used   Substance Use Topics    Alcohol use: Yes     Comment: socially    Drug use: No       /68   Wt 68.6 kg (151 lb 3.8 oz)   LMP 2020   BMI 24.41 kg/m²     ROS:  GENERAL: Denies weight gain or weight loss. Feeling well overall.   SKIN: Denies rash or lesions.   HEAD: Denies head injury or headache.   NODES: Denies enlarged lymph nodes.   CHEST: Denies chest pain or shortness of breath.   CARDIOVASCULAR: Denies palpitations or left sided chest pain.   ABDOMEN: No abdominal pain, constipation, diarrhea, nausea, vomiting or rectal bleeding.   URINARY: No frequency, dysuria, hematuria, or burning on urination.  REPRODUCTIVE: See HPI.   BREASTS: The patient performs breast self-examination and denies pain, lumps, or nipple discharge.   HEMATOLOGIC: No easy bruisability or excessive bleeding.  MUSCULOSKELETAL: Denies joint pain or swelling.   NEUROLOGIC: Denies syncope or weakness.   PSYCHIATRIC: Denies depression, anxiety or mood swings.    Physical Exam:  APPEARANCE: Well nourished, well developed, in no acute distress.  AFFECT: WNL,  alert and oriented x 3  SKIN: No acne or hirsutism  NECK: Neck symmetric without masses or thyromegaly  NODES: No inguinal, cervical, axillary, or femoral lymph node enlargement  CHEST: Good respiratory effect  ABDOMEN: Soft.  No tenderness or masses.  No hepatosplenomegaly.  No hernias.  BREASTS: Symmetrical, no skin changes or visible lesions.  No nipple discharge bilaterally. 2 cm x 1 cm smooth, mobile, concentric mass noted to left breast at 9 o'clock near sternal border. No TTP.   PELVIC: Normal external genitalia without lesions.  Normal hair distribution.  Adequate perineal body, normal urethral meatus.  Vagina moist and well rugated without lesions or discharge.  Cervix pink, without lesions, discharge or tenderness.  No significant cystocele or rectocele.  Bimanual exam shows uterus to be normal size, regular, mobile and nontender.  Adnexa without masses or tenderness.    EXTREMITIES: No edema.    ASSESSMENT AND PLAN  1. Encounter for annual routine gynecological examination     2. Breast mass  US Breast Left Complete    Mammo Digital Diagnostic Left with Valente   3. Screen for STD (sexually transmitted disease)  C. trachomatis/N. gonorrhoeae by AMP DNA    HIV-1 and HIV-2 antibodies    RPR    Hepatitis panel, acute    Vaginosis Screen by DNA Probe   4. Menorrhagia with regular cycle  CBC auto differential   5. Screening for malignant neoplasm of cervix  Liquid-Based Pap Smear, Screening    HPV High Risk Genotypes, PCR       Patient was counseled today on diet, exercise and A.C.S. Pap guidelines and recommendations for yearly pelvic exams, mammograms and monthly self breast exams; to see her PCP for other health maintenance.     Pt declines BC at this time for dysmenorrhea and heavy cycles. Discussed options and to let me know if the symptoms become bothersome to her. Will check CBC today.     Full STD panel per pt request.     Palpable breast mass noted on exam; pt reports she has never noticed it before.  Diagnostic mmg and US ordered. F/u pending results.     For vaginal irritation with protopic, discussed using Aquaphor as a barrier so there is no vaginal contact. If that doesn't help discussed avoiding the medication in the vulvar.groin area.     Well Woman:   - Pap smear: done today per pt request; Pap with HPV co-testing today. If NILM HPV - repeat in 5 years per current ASCCP guidelines.   - Mammogram: Due at age 40  - Colonoscopy: Due at age 48  - Dexa: due age 63  - Immunizations: with PCP  - Labs: with PCP  - Exercise counseling provided    F/u 1 year or PRN      LINDA Wright

## 2020-10-13 NOTE — ASSESSMENT & PLAN NOTE
Discussed with patient that vitiligo symptoms are unrelated to her thyroid nodule.  The vitiligo is a result of autoimmune dysfunction.  Discussed how you can have two separate autoimmune disorders such as Graves disease or Hashimoto's along with vitiligo; however, the two are not interdependent.  Follow-up with derm.

## 2020-10-13 NOTE — LETTER
October 13, 2020      Herlinda Hernandez MD  [address]  [facility] [address]           [facility]  [address] [place] [address]  Phone: [phone]  Fax: [phone]          Patient: Puja Hernandez   MR Number: [id number]   YOB: 1985   Date of Visit: 10/13/2020       Dear Dr. Herlinda Hernandez:    Thank you for referring Puja Hernandez to me for evaluation. Attached you will find relevant portions of my assessment and plan of care.    If you have questions, please do not hesitate to call me. I look forward to following Puja Hernandez along with you.    Sincerely,    Juliette L. Sandifer Kum-Nji, MD    Enclosure  CC:  No Recipients    If you would like to receive this communication electronically, please contact [email or web address] or [phone] to request more information on [facility] access.    For providers and/or their staff who would like to refer a patient to [facility], please contact us through our one-stop-shop provider referral line, [facility], at [phone].    If you feel you have received this communication in error or would no longer like to receive these types of communications, please e-mail [email or web address]

## 2020-10-13 NOTE — ASSESSMENT & PLAN NOTE
Patient with history of left thyroid nodule that has grown over time.  FNA x2 in the past was negative.  Last thyroid ultrasound 2018.  Plan to repeat now.  Discussed with patient that this adenoma will likely continue to grow in the future.  She is interested and surgery however she is interested in the under arm approach.  Will follow-up results of thyroid ultrasound. Patient willing to do repeat FNA if needed. Recheck TSH. Refer to [facility] for US.

## 2020-10-14 ENCOUNTER — HOSPITAL ENCOUNTER (OUTPATIENT)
Dept: RADIOLOGY | Facility: OTHER | Age: 35
Discharge: HOME | End: 2020-10-14
Attending: INTERNAL MEDICINE
Payer: COMMERCIAL

## 2020-10-14 LAB
HAV IGM SERPL QL IA: NEGATIVE
HBV CORE IGM SERPL QL IA: NEGATIVE
HBV SURFACE AG SERPL QL IA: NEGATIVE
HCV AB SERPL QL IA: NEGATIVE
HIV 1+2 AB+HIV1 P24 AG SERPL QL IA: NEGATIVE
RPR SER QL: NORMAL
TSH SERPL DL<=0.005 MIU/L-ACNC: 0.56 UIU/ML (ref 0.4–4)

## 2020-10-14 PROCEDURE — 76536 US EXAM OF HEAD AND NECK: CPT | Mod: 26,,, | Performed by: INTERNAL MEDICINE

## 2020-10-14 PROCEDURE — 76536 US SOFT TISSUE HEAD NECK THYROID: ICD-10-PCS | Mod: 26,,, | Performed by: INTERNAL MEDICINE

## 2020-10-14 PROCEDURE — 76536 US EXAM OF HEAD AND NECK: CPT | Mod: TC

## 2020-10-15 LAB
CANDIDA RRNA VAG QL PROBE: POSITIVE
G VAGINALIS RRNA GENITAL QL PROBE: NEGATIVE
T VAGINALIS RRNA GENITAL QL PROBE: NEGATIVE

## 2020-10-16 ENCOUNTER — PATIENT MESSAGE (OUTPATIENT)
Dept: OBSTETRICS AND GYNECOLOGY | Facility: CLINIC | Age: 35
End: 2020-10-16

## 2020-10-16 ENCOUNTER — PATIENT MESSAGE (OUTPATIENT)
Dept: ENDOCRINOLOGY | Facility: CLINIC | Age: 35
End: 2020-10-16

## 2020-10-19 ENCOUNTER — PATIENT MESSAGE (OUTPATIENT)
Dept: OBSTETRICS AND GYNECOLOGY | Facility: CLINIC | Age: 35
End: 2020-10-19

## 2020-10-19 ENCOUNTER — TELEPHONE (OUTPATIENT)
Dept: ENDOCRINOLOGY | Facility: CLINIC | Age: 35
End: 2020-10-19

## 2020-10-19 RX ORDER — FLUCONAZOLE 150 MG/1
150 TABLET ORAL ONCE
Qty: 2 TABLET | Refills: 1 | Status: SHIPPED | OUTPATIENT
Start: 2020-10-19 | End: 2020-10-19

## 2020-10-19 NOTE — TELEPHONE ENCOUNTER
----- Message from Silvia Saldivar MD sent at 10/16/2020  4:49 PM CDT -----  Regarding: RE: thyroid US  Yes you are correct  - the nodule we are following is in the left lobe, but on the 2018 study it was described as right. That nodule is increased on the current study occupying most of the lobe. The maximal dimension in 2018 was 5.7 cm and currently 7.2 cm.    Silvia   ----- Message -----  From: Juliette L. Sandifer Kum-Nji, MD  Sent: 10/16/2020   3:41 PM CDT  To: Silvia Saldivar MD  Subject: thyroid US                                       I notice there is an error on the thyroid US report form 2018 that reverse the findings of the left and right. Can you please compare the two and let me know if the left thyroid nodule has changed considerably in size which might warrant a repeat biopsy. The size of the nodule on your most recent report seems to reflect the same size of the entire L lobe.    Dr. Sandifer

## 2020-10-19 NOTE — TELEPHONE ENCOUNTER
Please notify pt, spoke with radiology. The nodule did grown on recent US and biopsy is suggested.     Please schedule pt for FNA on the [facility] with Dr. Navarrete.

## 2020-10-21 ENCOUNTER — PATIENT MESSAGE (OUTPATIENT)
Dept: OBSTETRICS AND GYNECOLOGY | Facility: CLINIC | Age: 35
End: 2020-10-21

## 2020-10-22 ENCOUNTER — HOSPITAL ENCOUNTER (OUTPATIENT)
Dept: RADIOLOGY | Facility: HOSPITAL | Age: 35
Discharge: HOME | End: 2020-10-22
Attending: NURSE PRACTITIONER
Payer: COMMERCIAL

## 2020-10-22 DIAGNOSIS — N63.0 BREAST MASS: ICD-10-CM

## 2020-10-22 LAB
HPV HR 12 DNA SPEC QL NAA+PROBE: NEGATIVE
HPV16 AG SPEC QL: NEGATIVE
HPV18 DNA SPEC QL NAA+PROBE: NEGATIVE

## 2020-10-22 PROCEDURE — 77062 BREAST TOMOSYNTHESIS BI: CPT | Mod: 26,,, | Performed by: RADIOLOGY

## 2020-10-22 PROCEDURE — 77066 MAMMO DIGITAL DIAGNOSTIC BILAT WITH TOMO: ICD-10-PCS | Mod: 26,,, | Performed by: RADIOLOGY

## 2020-10-22 PROCEDURE — 77062 MAMMO DIGITAL DIAGNOSTIC BILAT WITH TOMO: ICD-10-PCS | Mod: 26,,, | Performed by: RADIOLOGY

## 2020-10-22 PROCEDURE — 77066 DX MAMMO INCL CAD BI: CPT | Mod: 26,,, | Performed by: RADIOLOGY

## 2020-10-22 PROCEDURE — 77066 DX MAMMO INCL CAD BI: CPT | Mod: TC

## 2020-10-22 PROCEDURE — 76642 ULTRASOUND BREAST LIMITED: CPT | Mod: 26,LT,, | Performed by: RADIOLOGY

## 2020-10-22 PROCEDURE — 76642 US BREAST LEFT LIMITED: ICD-10-PCS | Mod: 26,LT,, | Performed by: RADIOLOGY

## 2020-10-22 PROCEDURE — 76642 ULTRASOUND BREAST LIMITED: CPT | Mod: TC,LT

## 2020-10-22 NOTE — TELEPHONE ENCOUNTER
"Attempted to reach pt twice, rings numerous times then states "your call cannot be completed at this time."  "

## 2020-10-23 ENCOUNTER — PATIENT MESSAGE (OUTPATIENT)
Dept: ADMINISTRATIVE | Facility: OTHER | Age: 35
End: 2020-10-23

## 2020-10-26 DIAGNOSIS — N63.0 BREAST MASS: Primary | ICD-10-CM

## 2020-10-30 ENCOUNTER — PATIENT MESSAGE (OUTPATIENT)
Dept: ENDOCRINOLOGY | Facility: CLINIC | Age: 35
End: 2020-10-30

## 2020-10-30 DIAGNOSIS — E04.1 THYROID NODULE: Primary | ICD-10-CM

## 2020-10-30 NOTE — TELEPHONE ENCOUNTER
Dr. Navarrete staff:  Per Dr. Sandifer, Please schedule pt for FNA on the [facility] with Dr. Navarrete.     Pt is aware, please contact her to schedule FNA    Thank you

## 2020-11-10 LAB
FINAL PATHOLOGIC DIAGNOSIS: NORMAL
Lab: NORMAL

## 2020-11-11 ENCOUNTER — HOSPITAL ENCOUNTER (OUTPATIENT)
Dept: ENDOCRINOLOGY | Facility: CLINIC | Age: 35
Discharge: HOME | End: 2020-11-11
Attending: INTERNAL MEDICINE
Payer: COMMERCIAL

## 2020-11-11 DIAGNOSIS — E04.1 THYROID NODULE: ICD-10-CM

## 2020-11-11 PROCEDURE — 10005 US FINE NEEDLE ASPIRATION THYROID, FIRST LESION: ICD-10-PCS | Mod: S$GLB,,, | Performed by: INTERNAL MEDICINE

## 2020-11-11 PROCEDURE — 88173 PR  INTERPRETATION OF FNA SMEAR: ICD-10-PCS | Mod: 26,,, | Performed by: PATHOLOGY

## 2020-11-11 PROCEDURE — 88173 CYTOPATH EVAL FNA REPORT: CPT | Performed by: PATHOLOGY

## 2020-11-11 PROCEDURE — 88173 CYTOPATH EVAL FNA REPORT: CPT | Mod: 26,,, | Performed by: PATHOLOGY

## 2020-11-11 PROCEDURE — 10005 FNA BX W/US GDN 1ST LES: CPT | Mod: S$GLB,,, | Performed by: INTERNAL MEDICINE

## 2020-11-16 ENCOUNTER — PATIENT MESSAGE (OUTPATIENT)
Dept: ENDOCRINOLOGY | Facility: CLINIC | Age: 35
End: 2020-11-16

## 2020-12-02 ENCOUNTER — PATIENT MESSAGE (OUTPATIENT)
Dept: ENDOCRINOLOGY | Facility: CLINIC | Age: 35
End: 2020-12-02

## 2020-12-03 ENCOUNTER — TELEPHONE (OUTPATIENT)
Dept: ENDOCRINOLOGY | Facility: CLINIC | Age: 35
End: 2020-12-03

## 2020-12-03 NOTE — TELEPHONE ENCOUNTER
Pt had FNA done on 11/11/20 by Dr. Navarrete  She does want molecular markers to be sent out to [facility] (see previous message)  Thank you

## 2020-12-03 NOTE — TELEPHONE ENCOUNTER
----- Message from Keara Rivers MA sent at 12/3/2020  9:32 AM CST -----  I am Dr Landon OLVERA and I will be working on sending it out today or tomorrow.

## 2020-12-16 ENCOUNTER — PATIENT MESSAGE (OUTPATIENT)
Dept: ENDOCRINOLOGY | Facility: CLINIC | Age: 35
End: 2020-12-16

## 2020-12-17 ENCOUNTER — TELEPHONE (OUTPATIENT)
Dept: ENDOCRINOLOGY | Facility: CLINIC | Age: 35
End: 2020-12-17

## 2020-12-17 NOTE — TELEPHONE ENCOUNTER
"----- Message from Keara Rivers MA sent at 12/17/2020 11:19 AM CST -----  Good afternoon it was slides that were sent off due to it was "Flus" they can take up to 6 weeks to come back , it was sent off on Dec 3rd.     "

## 2021-01-06 ENCOUNTER — TELEPHONE (OUTPATIENT)
Dept: INTERNAL MEDICINE | Facility: CLINIC | Age: 36
End: 2021-01-06

## 2021-01-06 ENCOUNTER — PATIENT MESSAGE (OUTPATIENT)
Dept: DERMATOLOGY | Facility: CLINIC | Age: 36
End: 2021-01-06

## 2021-01-29 ENCOUNTER — PATIENT MESSAGE (OUTPATIENT)
Dept: ENDOCRINOLOGY | Facility: CLINIC | Age: 36
End: 2021-01-29

## 2021-02-17 ENCOUNTER — TELEPHONE (OUTPATIENT)
Dept: ENDOCRINOLOGY | Facility: CLINIC | Age: 36
End: 2021-02-17

## 2021-02-18 ENCOUNTER — PATIENT MESSAGE (OUTPATIENT)
Dept: ENDOCRINOLOGY | Facility: CLINIC | Age: 36
End: 2021-02-18

## 2021-03-03 ENCOUNTER — TELEPHONE (OUTPATIENT)
Dept: ENDOCRINOLOGY | Facility: CLINIC | Age: 36
End: 2021-03-03

## 2021-03-03 LAB
FINAL PATHOLOGIC DIAGNOSIS: ABNORMAL
SUPPLEMENTAL DIAGNOSIS: ABNORMAL

## 2021-03-04 ENCOUNTER — TELEPHONE (OUTPATIENT)
Dept: ENDOCRINOLOGY | Facility: CLINIC | Age: 36
End: 2021-03-04

## 2021-03-04 DIAGNOSIS — E04.1 THYROID NODULE: Primary | ICD-10-CM

## 2021-05-04 ENCOUNTER — IMMUNIZATION (OUTPATIENT)
Dept: OBSTETRICS AND GYNECOLOGY | Facility: CLINIC | Age: 36
End: 2021-05-04
Payer: MEDICAID

## 2021-05-04 DIAGNOSIS — Z23 NEED FOR VACCINATION: Primary | ICD-10-CM

## 2021-05-04 PROCEDURE — 91300 COVID-19, MRNA, LNP-S, PF, 30 MCG/0.3 ML DOSE VACCINE: CPT | Mod: PBBFAC | Performed by: FAMILY MEDICINE

## 2021-05-24 ENCOUNTER — IMMUNIZATION (OUTPATIENT)
Dept: OBSTETRICS AND GYNECOLOGY | Facility: CLINIC | Age: 36
End: 2021-05-24
Payer: MEDICAID

## 2021-05-24 DIAGNOSIS — Z23 NEED FOR VACCINATION: Primary | ICD-10-CM

## 2021-05-24 PROCEDURE — 91300 COVID-19, MRNA, LNP-S, PF, 30 MCG/0.3 ML DOSE VACCINE: CPT | Mod: PBBFAC

## 2021-05-24 PROCEDURE — 0002A COVID-19, MRNA, LNP-S, PF, 30 MCG/0.3 ML DOSE VACCINE: CPT | Mod: PBBFAC

## 2021-06-25 ENCOUNTER — PATIENT MESSAGE (OUTPATIENT)
Dept: DERMATOLOGY | Facility: CLINIC | Age: 36
End: 2021-06-25

## 2021-06-28 ENCOUNTER — OFFICE VISIT (OUTPATIENT)
Dept: DERMATOLOGY | Facility: CLINIC | Age: 36
End: 2021-06-28
Payer: MEDICAID

## 2021-06-28 DIAGNOSIS — L80 VITILIGO: ICD-10-CM

## 2021-06-28 DIAGNOSIS — L70.0 ACNE VULGARIS: Primary | ICD-10-CM

## 2021-06-28 PROCEDURE — 99214 PR OFFICE/OUTPT VISIT, EST, LEVL IV, 30-39 MIN: ICD-10-PCS | Mod: S$GLB,,, | Performed by: DERMATOLOGY

## 2021-06-28 PROCEDURE — 99214 OFFICE O/P EST MOD 30 MIN: CPT | Mod: S$GLB,,, | Performed by: DERMATOLOGY

## 2021-06-28 RX ORDER — TRETINOIN 0.5 MG/G
CREAM TOPICAL
Qty: 20 G | Refills: 5 | Status: SHIPPED | OUTPATIENT
Start: 2021-06-28 | End: 2022-10-03 | Stop reason: SDUPTHER

## 2021-06-28 RX ORDER — CLINDAMYCIN PHOSPHATE 10 MG/G
GEL TOPICAL
Qty: 30 G | Refills: 3 | Status: SHIPPED | OUTPATIENT
Start: 2021-06-28

## 2021-07-12 ENCOUNTER — PATIENT MESSAGE (OUTPATIENT)
Dept: INTERNAL MEDICINE | Facility: CLINIC | Age: 36
End: 2021-07-12

## 2021-07-12 ENCOUNTER — PATIENT MESSAGE (OUTPATIENT)
Dept: ENDOCRINOLOGY | Facility: CLINIC | Age: 36
End: 2021-07-12

## 2021-07-12 ENCOUNTER — PATIENT MESSAGE (OUTPATIENT)
Dept: OBSTETRICS AND GYNECOLOGY | Facility: CLINIC | Age: 36
End: 2021-07-12

## 2021-07-12 DIAGNOSIS — E04.1 THYROID NODULE: Primary | ICD-10-CM

## 2021-07-13 ENCOUNTER — PATIENT MESSAGE (OUTPATIENT)
Dept: ENDOCRINOLOGY | Facility: CLINIC | Age: 36
End: 2021-07-13

## 2021-07-14 ENCOUNTER — HOSPITAL ENCOUNTER (OUTPATIENT)
Dept: RADIOLOGY | Facility: HOSPITAL | Age: 36
Discharge: HOME | End: 2021-07-14
Attending: NURSE PRACTITIONER
Payer: MEDICAID

## 2021-07-14 DIAGNOSIS — N63.20 BREAST MASS, LEFT: ICD-10-CM

## 2021-07-14 PROCEDURE — 76642 US BREAST LEFT LIMITED: ICD-10-PCS | Mod: 26,LT,, | Performed by: RADIOLOGY

## 2021-07-14 PROCEDURE — 76642 ULTRASOUND BREAST LIMITED: CPT | Mod: 26,LT,, | Performed by: RADIOLOGY

## 2021-07-14 PROCEDURE — 76642 ULTRASOUND BREAST LIMITED: CPT | Mod: TC,LT

## 2021-07-15 ENCOUNTER — PATIENT MESSAGE (OUTPATIENT)
Dept: ENDOCRINOLOGY | Facility: CLINIC | Age: 36
End: 2021-07-15

## 2021-07-15 ENCOUNTER — PATIENT MESSAGE (OUTPATIENT)
Dept: INTERNAL MEDICINE | Facility: CLINIC | Age: 36
End: 2021-07-15

## 2021-07-20 DIAGNOSIS — N63.0 BREAST MASS: Primary | ICD-10-CM

## 2021-07-22 ENCOUNTER — HOSPITAL ENCOUNTER (OUTPATIENT)
Dept: RADIOLOGY | Facility: HOSPITAL | Age: 36
Discharge: HOME | End: 2021-07-22
Attending: INTERNAL MEDICINE
Payer: MEDICAID

## 2021-07-22 DIAGNOSIS — E04.1 THYROID NODULE: ICD-10-CM

## 2021-07-22 PROCEDURE — 76536 US EXAM OF HEAD AND NECK: CPT | Mod: 26,,, | Performed by: RADIOLOGY

## 2021-07-22 PROCEDURE — 76536 US EXAM OF HEAD AND NECK: CPT | Mod: TC

## 2021-07-22 PROCEDURE — 76536 US SOFT TISSUE HEAD NECK THYROID: ICD-10-PCS | Mod: 26,,, | Performed by: RADIOLOGY

## 2021-07-26 ENCOUNTER — PATIENT MESSAGE (OUTPATIENT)
Dept: DERMATOLOGY | Facility: CLINIC | Age: 36
End: 2021-07-26

## 2021-07-27 ENCOUNTER — PATIENT MESSAGE (OUTPATIENT)
Dept: DERMATOLOGY | Facility: CLINIC | Age: 36
End: 2021-07-27

## 2021-07-31 ENCOUNTER — TELEPHONE (OUTPATIENT)
Dept: ENDOCRINOLOGY | Facility: CLINIC | Age: 36
End: 2021-07-31

## 2021-08-02 ENCOUNTER — PATIENT MESSAGE (OUTPATIENT)
Dept: ENDOCRINOLOGY | Facility: CLINIC | Age: 36
End: 2021-08-02

## 2021-08-18 ENCOUNTER — OFFICE VISIT (OUTPATIENT)
Dept: PRIMARY CARE CLINIC | Facility: CLINIC | Age: 36
End: 2021-08-18
Payer: MEDICAID

## 2021-08-18 VITALS
HEIGHT: 67 IN | TEMPERATURE: 99 F | WEIGHT: 156.06 LBS | DIASTOLIC BLOOD PRESSURE: 80 MMHG | RESPIRATION RATE: 20 BRPM | BODY MASS INDEX: 24.49 KG/M2 | OXYGEN SATURATION: 97 % | SYSTOLIC BLOOD PRESSURE: 100 MMHG | HEART RATE: 78 BPM

## 2021-08-18 DIAGNOSIS — L80 VITILIGO: ICD-10-CM

## 2021-08-18 DIAGNOSIS — E04.1 THYROID NODULE: Primary | ICD-10-CM

## 2021-08-18 DIAGNOSIS — L70.0 ACNE VULGARIS: ICD-10-CM

## 2021-08-18 PROCEDURE — 99203 OFFICE O/P NEW LOW 30 MIN: CPT | Mod: S$PBB,,, | Performed by: FAMILY MEDICINE

## 2021-08-18 PROCEDURE — 99203 PR OFFICE/OUTPT VISIT, NEW, LEVL III, 30-44 MIN: ICD-10-PCS | Mod: S$PBB,,, | Performed by: FAMILY MEDICINE

## 2021-08-18 PROCEDURE — 99214 OFFICE O/P EST MOD 30 MIN: CPT | Mod: PBBFAC,PN | Performed by: FAMILY MEDICINE

## 2021-08-18 PROCEDURE — 99999 PR PBB SHADOW E&M-EST. PATIENT-LVL IV: CPT | Mod: PBBFAC,,, | Performed by: FAMILY MEDICINE

## 2021-08-18 PROCEDURE — 99999 PR PBB SHADOW E&M-EST. PATIENT-LVL IV: ICD-10-PCS | Mod: PBBFAC,,, | Performed by: FAMILY MEDICINE

## 2021-08-27 ENCOUNTER — LAB VISIT (OUTPATIENT)
Dept: LAB | Facility: HOSPITAL | Age: 36
End: 2021-08-27
Payer: MEDICAID

## 2021-08-27 DIAGNOSIS — E04.1 THYROID NODULE: ICD-10-CM

## 2021-08-27 DIAGNOSIS — E04.1 NONTOXIC UNINODULAR GOITER: Primary | ICD-10-CM

## 2021-08-27 DIAGNOSIS — E55.9 AVITAMINOSIS D: ICD-10-CM

## 2021-08-27 LAB
CALCIUM SERPL-MCNC: 9.7 MG/DL (ref 8.7–10.5)
PTH-INTACT SERPL-MCNC: 84.1 PG/ML (ref 9–77)
T3 SERPL-MCNC: 125 NG/DL (ref 60–180)
T4 FREE SERPL-MCNC: 0.76 NG/DL (ref 0.71–1.51)
TSH SERPL DL<=0.005 MIU/L-ACNC: 0.48 UIU/ML (ref 0.4–4)
TSH SERPL DL<=0.005 MIU/L-ACNC: 0.48 UIU/ML (ref 0.4–4)

## 2021-08-27 PROCEDURE — 84443 ASSAY THYROID STIM HORMONE: CPT | Performed by: INTERNAL MEDICINE

## 2021-08-27 PROCEDURE — 86376 MICROSOMAL ANTIBODY EACH: CPT

## 2021-08-27 PROCEDURE — 84439 ASSAY OF FREE THYROXINE: CPT

## 2021-08-27 PROCEDURE — 83970 ASSAY OF PARATHORMONE: CPT

## 2021-08-27 PROCEDURE — 82310 ASSAY OF CALCIUM: CPT

## 2021-08-27 PROCEDURE — 82652 VIT D 1 25-DIHYDROXY: CPT

## 2021-08-27 PROCEDURE — 82308 ASSAY OF CALCITONIN: CPT

## 2021-08-27 PROCEDURE — 86800 THYROGLOBULIN ANTIBODY: CPT

## 2021-08-27 PROCEDURE — 84480 ASSAY TRIIODOTHYRONINE (T3): CPT

## 2021-08-28 LAB
THYROGLOB AB SERPL IA-ACNC: <4 IU/ML (ref 0–3.9)
THYROPEROXIDASE IGG SERPL-ACNC: <6 IU/ML

## 2021-08-30 LAB — 1,25(OH)2D3 SERPL-MCNC: 94 PG/ML (ref 20–79)

## 2021-09-03 LAB — CALCIT SERPL-MCNC: <5 PG/ML

## 2021-09-20 ENCOUNTER — PATIENT MESSAGE (OUTPATIENT)
Dept: ENDOCRINOLOGY | Facility: CLINIC | Age: 36
End: 2021-09-20

## 2021-09-21 ENCOUNTER — PATIENT MESSAGE (OUTPATIENT)
Dept: ENDOCRINOLOGY | Facility: CLINIC | Age: 36
End: 2021-09-21

## 2021-10-04 ENCOUNTER — PATIENT MESSAGE (OUTPATIENT)
Dept: PRIMARY CARE CLINIC | Facility: CLINIC | Age: 36
End: 2021-10-04

## 2021-10-04 ENCOUNTER — HOSPITAL ENCOUNTER (OUTPATIENT)
Dept: RADIOLOGY | Facility: HOSPITAL | Age: 36
Discharge: HOME | End: 2021-10-04
Attending: NURSE PRACTITIONER
Payer: MEDICAID

## 2021-10-04 DIAGNOSIS — N63.0 BREAST MASS: ICD-10-CM

## 2021-10-04 PROCEDURE — 77066 DX MAMMO INCL CAD BI: CPT | Mod: 26,,, | Performed by: RADIOLOGY

## 2021-10-04 PROCEDURE — 77066 MAMMO DIGITAL DIAGNOSTIC BILAT WITH TOMO: ICD-10-PCS | Mod: 26,,, | Performed by: RADIOLOGY

## 2021-10-04 PROCEDURE — 77062 BREAST TOMOSYNTHESIS BI: CPT | Mod: TC

## 2021-10-04 PROCEDURE — 76642 ULTRASOUND BREAST LIMITED: CPT | Mod: 26,LT,, | Performed by: RADIOLOGY

## 2021-10-04 PROCEDURE — 77062 BREAST TOMOSYNTHESIS BI: CPT | Mod: 26,,, | Performed by: RADIOLOGY

## 2021-10-04 PROCEDURE — 77062 MAMMO DIGITAL DIAGNOSTIC BILAT WITH TOMO: ICD-10-PCS | Mod: 26,,, | Performed by: RADIOLOGY

## 2021-10-04 PROCEDURE — 76642 US BREAST LEFT LIMITED: ICD-10-PCS | Mod: 26,LT,, | Performed by: RADIOLOGY

## 2021-10-04 PROCEDURE — 76642 ULTRASOUND BREAST LIMITED: CPT | Mod: TC,LT

## 2021-11-01 DIAGNOSIS — N63.20 LEFT BREAST MASS: ICD-10-CM

## 2021-11-01 DIAGNOSIS — Z12.31 ENCOUNTER FOR SCREENING MAMMOGRAM FOR MALIGNANT NEOPLASM OF BREAST: Primary | ICD-10-CM

## 2021-12-18 ENCOUNTER — IMMUNIZATION (OUTPATIENT)
Dept: INTERNAL MEDICINE | Facility: CLINIC | Age: 36
End: 2021-12-18
Payer: COMMERCIAL

## 2021-12-18 DIAGNOSIS — Z23 NEED FOR VACCINATION: Primary | ICD-10-CM

## 2021-12-18 PROCEDURE — 91300 COVID-19, MRNA, LNP-S, PF, 30 MCG/0.3 ML DOSE VACCINE: CPT | Mod: PBBFAC

## 2021-12-18 PROCEDURE — 0003A COVID-19, MRNA, LNP-S, PF, 30 MCG/0.3 ML DOSE VACCINE: CPT | Mod: PBBFAC,CV19

## 2022-01-12 ENCOUNTER — LAB VISIT (OUTPATIENT)
Dept: PRIMARY CARE CLINIC | Facility: CLINIC | Age: 37
End: 2022-01-12
Payer: COMMERCIAL

## 2022-01-12 DIAGNOSIS — E04.1 THYROID NODULE: Primary | ICD-10-CM

## 2022-01-12 DIAGNOSIS — E04.1 THYROID NODULE: ICD-10-CM

## 2022-01-12 LAB
T3 SERPL-MCNC: 103 NG/DL (ref 60–180)
T4 FREE SERPL-MCNC: 0.88 NG/DL (ref 0.71–1.51)
TSH SERPL DL<=0.005 MIU/L-ACNC: 0.28 UIU/ML (ref 0.4–4)

## 2022-01-12 PROCEDURE — 36415 PR COLLECTION VENOUS BLOOD,VENIPUNCTURE: ICD-10-PCS | Mod: S$GLB,,, | Performed by: INTERNAL MEDICINE

## 2022-01-12 PROCEDURE — 84480 ASSAY TRIIODOTHYRONINE (T3): CPT

## 2022-01-12 PROCEDURE — 36415 COLL VENOUS BLD VENIPUNCTURE: CPT | Mod: S$GLB,,, | Performed by: INTERNAL MEDICINE

## 2022-01-12 PROCEDURE — 36415 COLL VENOUS BLD VENIPUNCTURE: CPT

## 2022-01-12 PROCEDURE — 84439 ASSAY OF FREE THYROXINE: CPT

## 2022-01-12 PROCEDURE — 84443 ASSAY THYROID STIM HORMONE: CPT

## 2022-01-12 NOTE — PROGRESS NOTES
Venipunture performed in left AC per MD order. Patient tolerated well. Lab results are pending.

## 2022-03-28 ENCOUNTER — OFFICE VISIT (OUTPATIENT)
Dept: DERMATOLOGY | Facility: CLINIC | Age: 37
End: 2022-03-28
Payer: COMMERCIAL

## 2022-03-28 ENCOUNTER — PATIENT MESSAGE (OUTPATIENT)
Dept: DERMATOLOGY | Facility: CLINIC | Age: 37
End: 2022-03-28

## 2022-03-28 DIAGNOSIS — L80 VITILIGO: Primary | ICD-10-CM

## 2022-03-28 PROCEDURE — 99215 PR OFFICE/OUTPT VISIT, EST, LEVL V, 40-54 MIN: ICD-10-PCS | Mod: S$GLB,,, | Performed by: DERMATOLOGY

## 2022-03-28 PROCEDURE — 99215 OFFICE O/P EST HI 40 MIN: CPT | Mod: S$GLB,,, | Performed by: DERMATOLOGY

## 2022-03-28 NOTE — PROGRESS NOTES
Patient Information  Name: Puja Hernandez  : 1985  MRN: [id number]     Referring Physician:  No ref. provider found   Primary Care Physician:  Herlinda Hernandez MD   Date of Visit: 2022      Subjective:     History of Present lllness:    Puja Hernandez is a 37 y.o. female who presents with a chief complaint of vitiligo.    Diagnosis: Vitiligo  Location: face, neck, arms, hands, groin area, sides of breasts  Signs/Symptoms: getting new spots, it is hard for her to treat with current light box (1 series from [facility]) because it is small and takes longer to treat each area. She has not been consistent because of the total time it takes to treat each individual spot. She was getting a good response when she had fewer spots and was using it consistently  Symptom course: worsening  Current treatment: nbUVB     Patient was last seen: 2021.  Prior notes by myself reviewed.   Clinical documentation obtained by nursing staff reviewed.    Review of Systems   Skin: Negative for itching and rash.       Objective:   Physical Exam   Constitutional: She appears well-developed and well-nourished. No distress.   HENT:   Mouth/Throat: Lips normal.    Eyes: Lids are normal.  No conjunctival no injection.   Neurological: She is alert and oriented to person, place, and time. She is not disoriented.   Psychiatric: She has a normal mood and affect.   Skin:   Areas Examined (abnormalities noted in diagram):   Head / Face Inspection Performed  Neck Inspection Performed  Chest / Axilla Inspection Performed  RUE Inspected  LUE Inspection Performed                 Diagram Legend     Erythematous scaling macule/papule c/w actinic keratosis       Vascular papule c/w angioma      Pigmented verrucoid papule/plaque c/w seborrheic keratosis      Yellow umbilicated papule c/w sebaceous hyperplasia      Irregularly shaped tan macule c/w lentigo     1-2 mm smooth white papules consistent with Milia      Movable subcutaneous cyst with  punctum c/w epidermal inclusion cyst      Subcutaneous movable cyst c/w pilar cyst      Firm pink to brown papule c/w dermatofibroma      Pedunculated fleshy papule(s) c/w skin tag(s)      Evenly pigmented macule c/w junctional nevus     Mildly variegated pigmented, slightly irregular-bordered macule c/w mildly atypical nevus      Flesh colored to evenly pigmented papule c/w intradermal nevus       Pink pearly papule/plaque c/w basal cell carcinoma      Erythematous hyperkeratotic cursted plaque c/w SCC      Surgical scar with no sign of skin cancer recurrence      Open and closed comedones      Inflammatory papules and pustules      Verrucoid papule consistent consistent with wart     Erythematous eczematous patches and plaques     Dystrophic onycholytic nail with subungual debris c/w onychomycosis     Umbilicated papule    Erythematous-base heme-crusted tan verrucoid plaque consistent with inflamed seborrheic keratosis     Erythematous Silvery Scaling Plaque c/w Psoriasis     See annotation      Assessment / Plan:        Vitiligo  - chronic problem with exacerbation/progression  Plan to restart phototherapy. Pt would like to have a larger unit so that she can treat her entire body at once given the multiple body area affected.  Prescription and supporting office notes sent to [facility] for 7 Series home phototherapy unit.  Continue Rx Protopic daily and Lidex cream 1 week on/1 week off.     Total time spent on the date of encounter:  >40 minutes of total time spent on the date of encounter, which includes face-to-face time and non-face-to-face time preparing to see the patient (eg, review of tests); obtaining and/or reviewing separately obtained history; performing a medically appropriate examination; documenting clinical information in the electronic or other health record; counseling and educating the patient/family/caregiver; ordering medications, tests, or procedures; referring and communicating with other  health care professionals (not separately reported); and/or care coordination (not separately reported).     Follow up in about 3 months (around 6/28/2022) for follow up, or sooner if symptoms worsening or not improving.      Rebekah Joyce MD, FAAD  [facility]

## 2022-04-14 ENCOUNTER — LAB VISIT (OUTPATIENT)
Dept: LAB | Facility: HOSPITAL | Age: 37
End: 2022-04-14
Attending: SURGERY
Payer: COMMERCIAL

## 2022-04-14 DIAGNOSIS — E04.1 THYROID NODULE: Primary | ICD-10-CM

## 2022-04-14 LAB
T3 SERPL-MCNC: 105 NG/DL (ref 60–180)
T4 FREE SERPL-MCNC: 0.86 NG/DL (ref 0.71–1.51)

## 2022-04-14 PROCEDURE — 84439 ASSAY OF FREE THYROXINE: CPT | Performed by: SURGERY

## 2022-04-14 PROCEDURE — 84480 ASSAY TRIIODOTHYRONINE (T3): CPT | Performed by: SURGERY

## 2022-04-14 PROCEDURE — 36415 COLL VENOUS BLD VENIPUNCTURE: CPT | Mod: PN | Performed by: SURGERY

## 2022-04-14 PROCEDURE — 84443 ASSAY THYROID STIM HORMONE: CPT | Performed by: SURGERY

## 2022-04-14 PROCEDURE — 84445 ASSAY OF TSI GLOBULIN: CPT | Performed by: SURGERY

## 2022-04-15 LAB — TSH SERPL DL<=0.005 MIU/L-ACNC: 0.6 UIU/ML (ref 0.4–4)

## 2022-04-18 LAB — TSI SER-ACNC: <0.1 IU/L

## 2022-04-26 ENCOUNTER — PATIENT MESSAGE (OUTPATIENT)
Dept: DERMATOLOGY | Facility: CLINIC | Age: 37
End: 2022-04-26
Payer: MEDICAID

## 2022-05-31 ENCOUNTER — PATIENT MESSAGE (OUTPATIENT)
Dept: DERMATOLOGY | Facility: CLINIC | Age: 37
End: 2022-05-31
Payer: MEDICAID

## 2022-06-10 ENCOUNTER — TELEPHONE (OUTPATIENT)
Dept: DERMATOLOGY | Facility: CLINIC | Age: 37
End: 2022-06-10
Payer: MEDICAID

## 2022-06-10 NOTE — TELEPHONE ENCOUNTER
----- Message from Yakov Hernandez sent at 6/10/2022  1:05 PM CDT -----  Contact: Chacho Jones calling from [employer] in reference to pt's Light therapy unit, stats that they have been calling from March in reference to RX..      GERALD    [phone]    FAX  [phone]

## 2022-07-11 ENCOUNTER — IMMUNIZATION (OUTPATIENT)
Dept: INTERNAL MEDICINE | Facility: CLINIC | Age: 37
End: 2022-07-11
Payer: MEDICAID

## 2022-07-11 ENCOUNTER — DOCUMENTATION ONLY (OUTPATIENT)
Dept: INTERNAL MEDICINE | Facility: CLINIC | Age: 37
End: 2022-07-11

## 2022-07-11 DIAGNOSIS — Z23 NEED FOR VACCINATION: Primary | ICD-10-CM

## 2022-07-11 PROCEDURE — 91305 COVID-19, MRNA, LNP-S, PF, 30 MCG/0.3 ML DOSE VACCINE (PFIZER): CPT | Mod: PBBFAC

## 2022-07-13 ENCOUNTER — PATIENT MESSAGE (OUTPATIENT)
Dept: DERMATOLOGY | Facility: CLINIC | Age: 37
End: 2022-07-13
Payer: MEDICAID

## 2022-10-12 ENCOUNTER — PATIENT MESSAGE (OUTPATIENT)
Dept: DERMATOLOGY | Facility: CLINIC | Age: 37
End: 2022-10-12
Payer: MEDICAID

## 2022-10-13 ENCOUNTER — PATIENT MESSAGE (OUTPATIENT)
Dept: DERMATOLOGY | Facility: CLINIC | Age: 37
End: 2022-10-13
Payer: MEDICAID

## 2022-10-20 ENCOUNTER — PATIENT MESSAGE (OUTPATIENT)
Dept: DERMATOLOGY | Facility: CLINIC | Age: 37
End: 2022-10-20
Payer: MEDICAID

## 2022-11-22 ENCOUNTER — IMMUNIZATION (OUTPATIENT)
Dept: INTERNAL MEDICINE | Facility: CLINIC | Age: 37
End: 2022-11-22
Payer: COMMERCIAL

## 2022-11-22 DIAGNOSIS — Z23 NEED FOR VACCINATION: Primary | ICD-10-CM

## 2022-11-22 PROCEDURE — 91312 COVID-19, MRNA, LNP-S, BIVALENT BOOSTER, PF, 30 MCG/0.3 ML DOSE: CPT | Mod: S$GLB,,, | Performed by: INTERNAL MEDICINE

## 2022-11-22 PROCEDURE — 91312 COVID-19, MRNA, LNP-S, BIVALENT BOOSTER, PF, 30 MCG/0.3 ML DOSE: ICD-10-PCS | Mod: S$GLB,,, | Performed by: INTERNAL MEDICINE

## 2022-11-22 PROCEDURE — 0124A COVID-19, MRNA, LNP-S, BIVALENT BOOSTER, PF, 30 MCG/0.3 ML DOSE: CPT | Mod: CV19,PBBFAC | Performed by: INTERNAL MEDICINE

## 2023-06-07 ENCOUNTER — PATIENT MESSAGE (OUTPATIENT)
Dept: DERMATOLOGY | Facility: CLINIC | Age: 38
End: 2023-06-07
Payer: COMMERCIAL

## 2023-06-16 ENCOUNTER — OFFICE VISIT (OUTPATIENT)
Dept: DERMATOLOGY | Facility: CLINIC | Age: 38
End: 2023-06-16
Payer: COMMERCIAL

## 2023-06-16 DIAGNOSIS — L80 VITILIGO: Primary | ICD-10-CM

## 2023-06-16 PROCEDURE — 1159F PR MEDICATION LIST DOCUMENTED IN MEDICAL RECORD: ICD-10-PCS | Mod: CPTII,95,, | Performed by: DERMATOLOGY

## 2023-06-16 PROCEDURE — 99214 PR OFFICE/OUTPT VISIT, EST, LEVL IV, 30-39 MIN: ICD-10-PCS | Mod: 95,,, | Performed by: DERMATOLOGY

## 2023-06-16 PROCEDURE — 1160F PR REVIEW ALL MEDS BY PRESCRIBER/CLIN PHARMACIST DOCUMENTED: ICD-10-PCS | Mod: CPTII,95,, | Performed by: DERMATOLOGY

## 2023-06-16 PROCEDURE — 1160F RVW MEDS BY RX/DR IN RCRD: CPT | Mod: CPTII,95,, | Performed by: DERMATOLOGY

## 2023-06-16 PROCEDURE — 99214 OFFICE O/P EST MOD 30 MIN: CPT | Mod: 95,,, | Performed by: DERMATOLOGY

## 2023-06-16 PROCEDURE — 1159F MED LIST DOCD IN RCRD: CPT | Mod: CPTII,95,, | Performed by: DERMATOLOGY

## 2023-06-16 RX ORDER — TACROLIMUS 1 MG/G
OINTMENT TOPICAL
Qty: 60 G | Refills: 3 | Status: SHIPPED | OUTPATIENT
Start: 2023-06-16 | End: 2024-03-12

## 2023-06-16 NOTE — PATIENT INSTRUCTIONS
Your [facility] has been [facility]  They are located in [place] at [address] just before the [address].  If you have any questions, please call the pharmacy at [phone].  Website: [email or web address]

## 2023-06-16 NOTE — PROGRESS NOTES
The patient location is: home  The chief complaint leading to consultation is: vitiligo  Visit type: virtual visit with synchronous audio and video  Total time spent with patient: 12 minutes  Each patient to whom I provide medical services by telemedicine is:  (1) informed of the relationship between the physician and patient and the respective role of any other health care provider with respect to management of the patient; and (2) notified that he or she may decline to receive medical services by telemedicine and may withdraw from such care at any time.  Patient Information  Name: Puja Hernandez  : 1985  MRN: [id number]     Referring Physician:  No ref. provider found   Primary Care Physician:  Herlinda Hernandez MD   Date of Visit: 2023      Subjective:     History of Present lllness:    Puja Hernandez is a 38 y.o. female who presents with a chief complaint of vitiligo.  Location: diffuse  Signs/Symptoms: lighter areas, getting new spots around eyes, on neck, and on arms (Had a procedure to shrink thryoid nodules and it worsened on her neck. Worsened on arm after covid booster.)  Symptom course: worsening  Current treatment: light tx with nbUVB, protopic ointment; has previously tried and failed systemic and topical steroids.    Patient was last seen: 3/28/2022.  Prior notes by myself reviewed.   Clinical documentation obtained by nursing staff reviewed.    Review of Systems    Objective:   Physical Exam   Constitutional: She appears well-developed and well-nourished. No distress.   HENT:   Mouth/Throat: Lips normal.    Eyes: Lids are normal.  No conjunctival no injection.   Neurological: She is alert and oriented to person, place, and time. She is not disoriented.   Psychiatric: She has a normal mood and affect.   Skin:   Areas Examined (abnormalities noted in diagram):   Head / Face Inspection Performed  Neck Inspection Performed  Chest / Axilla Inspection Performed  RUE Inspected  LUE Inspection  Performed          Diagram Legend     Erythematous scaling macule/papule c/w actinic keratosis       Vascular papule c/w angioma      Pigmented verrucoid papule/plaque c/w seborrheic keratosis      Yellow umbilicated papule c/w sebaceous hyperplasia      Irregularly shaped tan macule c/w lentigo     1-2 mm smooth white papules consistent with Milia      Movable subcutaneous cyst with punctum c/w epidermal inclusion cyst      Subcutaneous movable cyst c/w pilar cyst      Firm pink to brown papule c/w dermatofibroma      Pedunculated fleshy papule(s) c/w skin tag(s)      Evenly pigmented macule c/w junctional nevus     Mildly variegated pigmented, slightly irregular-bordered macule c/w mildly atypical nevus      Flesh colored to evenly pigmented papule c/w intradermal nevus       Pink pearly papule/plaque c/w basal cell carcinoma      Erythematous hyperkeratotic cursted plaque c/w SCC      Surgical scar with no sign of skin cancer recurrence      Open and closed comedones      Inflammatory papules and pustules      Verrucoid papule consistent consistent with wart     Erythematous eczematous patches and plaques     Dystrophic onycholytic nail with subungual debris c/w onychomycosis     Umbilicated papule    Erythematous-base heme-crusted tan verrucoid plaque consistent with inflamed seborrheic keratosis     Erythematous Silvery Scaling Plaque c/w Psoriasis     See annotation              [] Data reviewed  [] Prior external notes reviewed  [] Independent review of test  [] Management discussed with another provider  [] Independent historian    Assessment / Plan:        Vitiligo  - chronic problem with exacerbation/progression    -     ruxolitinib 1.5 % Crea; Apply to affected areas of face and neck BID prn vitiligo.  Dispense: 60 g; Refill: 3  Do not use on more than 10% body surface area. Do not use more than one tube per week.    -     tacrolimus (PROTOPIC) 0.1 % ointment; Compound tacrolimus 0.1% cream. Apply to  affected areas of body BID. Safe to use everyday.  Dispense: 60 g; Refill: 3      Follow up in about 3 months (around 9/16/2023).      Rebekah Joyce MD, FAAD  [facility]

## 2023-06-22 ENCOUNTER — PATIENT MESSAGE (OUTPATIENT)
Dept: DERMATOLOGY | Facility: CLINIC | Age: 38
End: 2023-06-22
Payer: COMMERCIAL

## 2023-09-27 ENCOUNTER — PATIENT MESSAGE (OUTPATIENT)
Dept: PRIMARY CARE CLINIC | Facility: CLINIC | Age: 38
End: 2023-09-27
Payer: COMMERCIAL

## 2023-11-06 ENCOUNTER — IMMUNIZATION (OUTPATIENT)
Dept: INTERNAL MEDICINE | Facility: CLINIC | Age: 38
End: 2023-11-06
Payer: COMMERCIAL

## 2023-11-06 DIAGNOSIS — Z23 NEED FOR VACCINATION: Primary | ICD-10-CM

## 2023-11-06 PROCEDURE — 91322 SARSCOV2 VAC 50 MCG/0.5ML IM: CPT | Mod: S$GLB,,, | Performed by: INTERNAL MEDICINE

## 2023-11-06 PROCEDURE — 90480 COVID-19 VAC, MRNA 2023 (MODERNA)(PF) 50 MCG/0.5 ML IM SUSR (12+): ICD-10-PCS | Mod: S$GLB,,, | Performed by: INTERNAL MEDICINE

## 2023-11-06 PROCEDURE — 91322 COVID-19 VAC, MRNA 2023 (MODERNA)(PF) 50 MCG/0.5 ML IM SUSR (12+): ICD-10-PCS | Mod: S$GLB,,, | Performed by: INTERNAL MEDICINE

## 2023-11-06 PROCEDURE — 90480 ADMN SARSCOV2 VAC 1/ONLY CMP: CPT | Mod: S$GLB,,, | Performed by: INTERNAL MEDICINE

## 2023-12-22 ENCOUNTER — HOSPITAL ENCOUNTER (OUTPATIENT)
Dept: RADIOLOGY | Facility: HOSPITAL | Age: 38
Discharge: HOME | End: 2023-12-22
Payer: COMMERCIAL

## 2023-12-22 DIAGNOSIS — Z12.31 ENCOUNTER FOR SCREENING MAMMOGRAM FOR BREAST CANCER: ICD-10-CM

## 2023-12-22 PROCEDURE — 77063 BREAST TOMOSYNTHESIS BI: CPT | Mod: 26,,, | Performed by: RADIOLOGY

## 2023-12-22 PROCEDURE — 77063 MAMMO DIGITAL SCREENING BILAT WITH TOMO: ICD-10-PCS | Mod: 26,,, | Performed by: RADIOLOGY

## 2023-12-22 PROCEDURE — 77067 SCR MAMMO BI INCL CAD: CPT | Mod: TC

## 2023-12-22 PROCEDURE — 77067 MAMMO DIGITAL SCREENING BILAT WITH TOMO: ICD-10-PCS | Mod: 26,,, | Performed by: RADIOLOGY

## 2023-12-22 PROCEDURE — 77067 SCR MAMMO BI INCL CAD: CPT | Mod: 26,,, | Performed by: RADIOLOGY

## 2024-01-09 DIAGNOSIS — L70.0 ACNE VULGARIS: ICD-10-CM

## 2024-01-10 RX ORDER — TRETINOIN 0.5 MG/G
CREAM TOPICAL
Qty: 20 G | Refills: 5 | Status: SHIPPED | OUTPATIENT
Start: 2024-01-10

## 2024-02-07 ENCOUNTER — PATIENT MESSAGE (OUTPATIENT)
Dept: PODIATRY | Facility: CLINIC | Age: 39
End: 2024-02-07
Payer: COMMERCIAL

## 2024-02-07 ENCOUNTER — ON-DEMAND VIRTUAL (OUTPATIENT)
Dept: URGENT CARE | Facility: CLINIC | Age: 39
End: 2024-02-07
Payer: COMMERCIAL

## 2024-02-07 ENCOUNTER — TELEPHONE (OUTPATIENT)
Dept: PODIATRY | Facility: CLINIC | Age: 39
End: 2024-02-07
Payer: COMMERCIAL

## 2024-02-07 DIAGNOSIS — M79.674 GREAT TOE PAIN, RIGHT: Primary | ICD-10-CM

## 2024-02-07 DIAGNOSIS — R21 RASH: ICD-10-CM

## 2024-02-07 PROCEDURE — 99202 OFFICE O/P NEW SF 15 MIN: CPT | Mod: 95,,, | Performed by: NURSE PRACTITIONER

## 2024-02-07 RX ORDER — TRIAMCINOLONE ACETONIDE 0.25 MG/G
OINTMENT TOPICAL 2 TIMES DAILY
Qty: 30 G | Refills: 0 | Status: SHIPPED | OUTPATIENT
Start: 2024-02-07

## 2024-02-07 NOTE — PROGRESS NOTES
Subjective:      Patient ID: Puja Hernandez is a 38 y.o. female.    Vitals:  vitals were not taken for this visit.     Chief Complaint: Toe Pain and Rash      Visit Type: TELE AUDIOVISUAL    Present with the patient at the time of consultation: TELEMED PRESENT WITH PATIENT: None    Past Medical History:   Diagnosis Date    Left thyroid nodule     Vitiligo      Past Surgical History:   Procedure Laterality Date    WISDOM TOOTH EXTRACTION       Review of patient's allergies indicates:   Allergen Reactions    House dust mite Other (See Comments)     Current Outpatient Medications on File Prior to Visit   Medication Sig Dispense Refill    clindamycin phosphate 1% (CLEOCIN T) 1 % gel Apply to face BID prn acne 30 g 3    fluocinonide 0.05% (LIDEX) 0.05 % cream Apply to affected areas of body daily-BID x 1 week on, then 1 week off. 60 g 2    ruxolitinib 1.5 % Crea Apply to affected areas of face and body twice daily as needed for vitiligo. 60 g 3    tacrolimus (PROTOPIC) 0.1 % ointment Apply to affected areas of face and body BID. 100 g 5    tacrolimus (PROTOPIC) 0.1 % ointment Compound tacrolimus 0.1% cream. Apply to affected areas of face and body BID. Safe to use everyday. 60 g 3    tretinoin (RETIN-A) 0.05 % cream Apply a pea-sized amount to face qhs. 20 g 5    [DISCONTINUED] ginkgo biloba 40 mg Tab Take 1 tablet by mouth Daily.       No current facility-administered medications on file prior to visit.     Family History   Problem Relation Age of Onset    Thyroid cancer Sister     Fibroids Mother     Hypertension Mother     Thyroid nodules Mother     Graves' disease Father     Diabetes Maternal Aunt     Hypertension Maternal Grandmother     Breast cancer Paternal Aunt     Ovarian cancer Neg Hx     Melanoma Neg Hx     Colon cancer Neg Hx        Medications Ordered                [id number] [address] [facility] [address]  [place] [address]    Telephone: [phone]   Fax: [phone]   Hours: Not open 24 hours                         E-Prescribed (1 of 1)              triamcinolone acetonide 0.025% (KENALOG) 0.025 % Oint    Sig: Apply topically 2 (two) times daily. Until symptoms improve.       Start: 2/7/24     Quantity: 30 g Refills: 0                           Ohs Peq Odvv Intake    2/7/2024 12:13 PM CST - Filed by Patient   What is your current physical address in the event of a medical emergency? [address]   Are you able to take your vital signs? No   Please attach any relevant images or files          Right big toe pain. Onset after hitting the toe 5 days ago. +bruising. Has podiatry appt scheduled for tomorrow. Tylenol and topical lidocaine is not relieving the pain. Seeking further relief.    Also developed a rash to the neck after eating [employer]. 5 days of symptoms. +itching. Feels "bumpy," no redness, warmth or swelling. No s/s of infection.    Toe Pain     Rash        Musculoskeletal:  Positive for pain and trauma.   Skin:  Positive for rash and bruising. Negative for erythema and hives.   Allergic/Immunologic: Negative for hives.        Objective:   The physical exam was conducted virtually.  Physical Exam   Constitutional: She is oriented to person, place, and time. She does not appear ill. No distress.   HENT:   Head: Normocephalic and atraumatic.   Nose: Nose normal.   Eyes: Extraocular movement intact   Pulmonary/Chest: Effort normal.   Abdominal: Normal appearance.   Musculoskeletal: Normal range of motion.         General: Normal range of motion.      Right foot: Right great toe: Exhibits decreased ROM, ecchymosis and tenderness.   Neurological: no focal deficit. She is alert and oriented to person, place, and time.   Skin: Skin is rash (fine, skin colored rash to neck. No vesicles. No s/s of secondary infection.). No erythema   Psychiatric: Her behavior is normal. Mood normal.   Vitals " reviewed.      Assessment:     1. Great toe pain, right    2. Rash        Plan:     Patient encouraged to monitor symptoms closely and instructed to follow-up for new or worsening symptoms. Further, in-person, evaluation is necessary for continued treatment. Please follow up with your primary care doctor or specialist as needed. Verbally discussed plan. Patient confirms understanding and is in agreement with treatment and plan.     You must understand that you've received a [facility] evaluation only and that you may be released before all your medical problems are known or treated. You, the patient, will arrange for follow up care as instructed.    Great toe pain, right    Rash  -     triamcinolone acetonide 0.025% (KENALOG) 0.025 % Oint; Apply topically 2 (two) times daily. Until symptoms improve.  Dispense: 30 g; Refill: 0

## 2024-02-08 ENCOUNTER — OFFICE VISIT (OUTPATIENT)
Dept: PODIATRY | Facility: CLINIC | Age: 39
End: 2024-02-08
Payer: COMMERCIAL

## 2024-02-08 ENCOUNTER — HOSPITAL ENCOUNTER (OUTPATIENT)
Dept: RADIOLOGY | Facility: HOSPITAL | Age: 39
Discharge: HOME | End: 2024-02-08
Attending: PODIATRIST
Payer: COMMERCIAL

## 2024-02-08 VITALS
HEIGHT: 67 IN | WEIGHT: 174.63 LBS | DIASTOLIC BLOOD PRESSURE: 79 MMHG | HEART RATE: 68 BPM | BODY MASS INDEX: 27.41 KG/M2 | SYSTOLIC BLOOD PRESSURE: 123 MMHG

## 2024-02-08 DIAGNOSIS — S90.221A SUBUNGUAL HEMATOMA OF TOENAIL OF RIGHT FOOT, INITIAL ENCOUNTER: ICD-10-CM

## 2024-02-08 DIAGNOSIS — S99.921A TOE INJURY, RIGHT, INITIAL ENCOUNTER: Primary | ICD-10-CM

## 2024-02-08 DIAGNOSIS — S99.921A TOE INJURY, RIGHT, INITIAL ENCOUNTER: ICD-10-CM

## 2024-02-08 PROCEDURE — 99204 OFFICE O/P NEW MOD 45 MIN: CPT | Mod: S$GLB,,, | Performed by: PODIATRIST

## 2024-02-08 PROCEDURE — 99999 PR PBB SHADOW E&M-EST. PATIENT-LVL IV: CPT | Mod: PBBFAC,,, | Performed by: PODIATRIST

## 2024-02-08 PROCEDURE — 73630 X-RAY EXAM OF FOOT: CPT | Mod: TC,PN,RT

## 2024-02-08 PROCEDURE — 73630 X-RAY EXAM OF FOOT: CPT | Mod: 26,RT,, | Performed by: RADIOLOGY

## 2024-02-08 RX ORDER — CYANOCOBALAMIN (VITAMIN B-12) 1000 MCG
TABLET, EXTENDED RELEASE ORAL
COMMUNITY
Start: 2023-02-01

## 2024-02-08 RX ORDER — GLUCOSAMINE/CHONDR SU A SOD 750-600 MG
TABLET ORAL
COMMUNITY
Start: 2023-02-16

## 2024-02-08 RX ORDER — LIDOCAINE HYDROCHLORIDE 20 MG/ML
JELLY TOPICAL
Qty: 30 ML | Refills: 0 | Status: SHIPPED | OUTPATIENT
Start: 2024-02-08 | End: 2024-03-12

## 2024-02-08 RX ORDER — MELOXICAM 15 MG/1
15 TABLET ORAL DAILY
Qty: 20 TABLET | Refills: 0 | Status: SHIPPED | OUTPATIENT
Start: 2024-02-08 | End: 2024-02-28

## 2024-02-08 RX ORDER — ASPIRIN 325 MG
TABLET, DELAYED RELEASE (ENTERIC COATED) ORAL
COMMUNITY
Start: 2023-02-01

## 2024-02-08 NOTE — PROGRESS NOTES
Subjective:      Patient ID: Puja Hernandez is a 39 y.o. female.    Chief Complaint:   Toe Injury (Right big toe)    Puja is a 39 y.o. female who presents to the podiatry clinic  with complaint of  right foot pain.   Hit it on the tub.   Throbbing last night.   Overall pain much better today  She did have some shoe that bothered her a few days ago because the nails were long.     [facility]  Right big toe pain. Onset after hitting the toe 5 days ago. +bruising. Has podiatry appt scheduled for tomorrow. Tylenol and topical lidocaine is not relieving the pain. Seeking further relief.      Past Medical History:   Diagnosis Date    Left thyroid nodule     Vitiligo      Past Surgical History:   Procedure Laterality Date    WISDOM TOOTH EXTRACTION       Current Outpatient Medications on File Prior to Visit   Medication Sig Dispense Refill    cholecalciferol, vitamin D3, 1,250 mcg (50,000 unit) capsule       clindamycin phosphate 1% (CLEOCIN T) 1 % gel Apply to face BID prn acne 30 g 3    fluocinonide 0.05% (LIDEX) 0.05 % cream Apply to affected areas of body daily-BID x 1 week on, then 1 week off. 60 g 2    ginkgo biloba leaf extract 120 mg Cap       ruxolitinib 1.5 % Crea Apply to affected areas of face and body twice daily as needed for vitiligo. 60 g 3    selenium 200 mcg Cap       tacrolimus (PROTOPIC) 0.1 % ointment Apply to affected areas of face and body BID. 100 g 5    tacrolimus (PROTOPIC) 0.1 % ointment Compound tacrolimus 0.1% cream. Apply to affected areas of face and body BID. Safe to use everyday. 60 g 3    tretinoin (RETIN-A) 0.05 % cream Apply a pea-sized amount to face qhs. 20 g 5    triamcinolone acetonide 0.025% (KENALOG) 0.025 % Oint Apply topically 2 (two) times daily. Until symptoms improve. 30 g 0     No current facility-administered medications on file prior to visit.     Review of patient's allergies indicates:   Allergen Reactions    House dust mite Other (See Comments)  "      Review of Systems   Constitutional: Negative for chills, decreased appetite, fever, malaise/fatigue, night sweats, weight gain and weight loss.   Cardiovascular:  Negative for chest pain, claudication, dyspnea on exertion, leg swelling, palpitations and syncope.   Respiratory:  Negative for cough and shortness of breath.    Endocrine: Negative for cold intolerance and heat intolerance.   Hematologic/Lymphatic: Negative for bleeding problem. Does not bruise/bleed easily.   Skin:  Positive for nail changes. Negative for color change, dry skin, flushing, itching, poor wound healing, rash, skin cancer, suspicious lesions and unusual hair distribution.   Musculoskeletal:  Negative for arthritis, back pain, falls, gout, joint pain, joint swelling, muscle cramps, muscle weakness, myalgias, neck pain and stiffness.        Toe pain   Gastrointestinal:  Negative for diarrhea, nausea and vomiting.   Neurological:  Negative for dizziness, focal weakness, light-headedness, numbness, paresthesias, tremors, vertigo and weakness.   Psychiatric/Behavioral:  Negative for altered mental status and depression. The patient does not have insomnia.    Allergic/Immunologic: Negative.            Objective:       Vitals:    02/08/24 1300   BP: 123/79   Pulse: 68   Weight: 79.2 kg (174 lb 9.7 oz)   Height: 5' 7" (1.702 m)   PainSc:   6   PainLoc: Toe   79.2 kg (174 lb 9.7 oz)     Physical Exam  Vitals reviewed.   Constitutional:       General: She is not in acute distress.     Appearance: She is well-developed. She is not ill-appearing, toxic-appearing or diaphoretic.      Comments:        Cardiovascular:      Pulses:           Dorsalis pedis pulses are 2+ on the right side and 2+ on the left side.        Posterior tibial pulses are 2+ on the right side and 2+ on the left side.   Musculoskeletal:         General: No deformity.      Right lower leg: No edema.      Left lower leg: No edema.      Right ankle: Normal.      Right Achilles " Tendon: Normal.      Left ankle: Normal.      Left Achilles Tendon: Normal.      Right foot: Decreased range of motion. Tenderness present. No deformity or bony tenderness.      Left foot: Decreased range of motion. No deformity.      Comments: + pop mild to lateral hallux nail border digit  No pop to digit with rom   No pain to central nail bed   Feet:      Right foot:      Protective Sensation: 10 sites tested.  10 sites sensed.      Skin integrity: No ulcer, blister, skin breakdown, erythema, warmth, callus or dry skin.      Left foot:      Protective Sensation: 10 sites tested.  10 sites sensed.      Skin integrity: No ulcer, blister, skin breakdown, erythema, warmth, callus or dry skin.      Toenail Condition: Left toenails are normal.      Comments: Subungal hematoma < 50 %. No active bleeding. No acute soi  Skin:     General: Skin is warm and dry.      Capillary Refill: Capillary refill takes 2 to 3 seconds.      Coloration: Skin is not pale.      Findings: No erythema or rash.   Neurological:      Mental Status: She is alert and oriented to person, place, and time.      Sensory: No sensory deficit.      Gait: Gait is intact.   Psychiatric:         Attention and Perception: Attention normal.         Mood and Affect: Mood normal.         Speech: Speech normal.         Behavior: Behavior normal.         Thought Content: Thought content normal.         Cognition and Memory: Cognition normal.         Judgment: Judgment normal.               Assessment:       Encounter Diagnoses   Name Primary?    Toe injury, right, initial encounter Yes    Subungual hematoma of toenail of right foot, initial encounter          Plan:       Puja was seen today for toe injury.    Diagnoses and all orders for this visit:    Toe injury, right, initial encounter  -     X-Ray Foot Complete Right; Future    Subungual hematoma of toenail of right foot, initial encounter    Other orders  -     meloxicam (MOBIC) 15 MG tablet; Take 1 tablet  (15 mg total) by mouth once daily. for 20 days  -     LIDOcaine HCL 2% (XYLOCAINE) 2 % jelly; Apply topically as needed (for toe pain).      I counseled the patient on her conditions, their implications and medical management.      - d/w pt likely due to shoegear/long nail. Tub was likely incidental however will rx xray    - no soi    - d/w pt can consider nail removal today however pain is almost a zero/ten. She is also considering going to [place]. Wound not be able to go in swimming pool/hot tub with nail avulsion    - rx mobic and topical lidocaine if pain returns    - at f/u consider nail removal    - prn          Follow up if symptoms worsen or fail to improve.

## 2024-03-12 ENCOUNTER — HOSPITAL ENCOUNTER (OUTPATIENT)
Dept: RADIOLOGY | Facility: HOSPITAL | Age: 39
Discharge: HOME | End: 2024-03-12
Attending: STUDENT IN AN ORGANIZED HEALTH CARE EDUCATION/TRAINING PROGRAM
Payer: COMMERCIAL

## 2024-03-12 ENCOUNTER — OFFICE VISIT (OUTPATIENT)
Dept: PRIMARY CARE CLINIC | Facility: CLINIC | Age: 39
End: 2024-03-12
Payer: COMMERCIAL

## 2024-03-12 VITALS
BODY MASS INDEX: 27.51 KG/M2 | OXYGEN SATURATION: 99 % | HEIGHT: 67 IN | DIASTOLIC BLOOD PRESSURE: 66 MMHG | SYSTOLIC BLOOD PRESSURE: 102 MMHG | WEIGHT: 175.25 LBS | HEART RATE: 77 BPM

## 2024-03-12 DIAGNOSIS — D72.810 LYMPHOCYTOPENIA: ICD-10-CM

## 2024-03-12 DIAGNOSIS — R61 NIGHT SWEATS: ICD-10-CM

## 2024-03-12 DIAGNOSIS — Z00.00 ANNUAL PHYSICAL EXAM: Primary | ICD-10-CM

## 2024-03-12 DIAGNOSIS — D69.6 THROMBOCYTOPENIA: ICD-10-CM

## 2024-03-12 DIAGNOSIS — E04.1 THYROID NODULE: ICD-10-CM

## 2024-03-12 DIAGNOSIS — R53.82 CHRONIC FATIGUE: ICD-10-CM

## 2024-03-12 DIAGNOSIS — L80 VITILIGO: ICD-10-CM

## 2024-03-12 DIAGNOSIS — Z80.8 FAMILY HISTORY OF THYROID CANCER: ICD-10-CM

## 2024-03-12 DIAGNOSIS — Z91.89 AT HIGH RISK FOR BREAST CANCER: ICD-10-CM

## 2024-03-12 DIAGNOSIS — Z76.89 ESTABLISHING CARE WITH NEW DOCTOR, ENCOUNTER FOR: ICD-10-CM

## 2024-03-12 PROCEDURE — 99999 PR PBB SHADOW E&M-EST. PATIENT-LVL V: CPT | Mod: PBBFAC,,, | Performed by: STUDENT IN AN ORGANIZED HEALTH CARE EDUCATION/TRAINING PROGRAM

## 2024-03-12 PROCEDURE — 71046 X-RAY EXAM CHEST 2 VIEWS: CPT | Mod: TC,PN

## 2024-03-12 PROCEDURE — 71046 X-RAY EXAM CHEST 2 VIEWS: CPT | Mod: 26,,, | Performed by: RADIOLOGY

## 2024-03-12 PROCEDURE — 99214 OFFICE O/P EST MOD 30 MIN: CPT | Mod: S$GLB,,, | Performed by: STUDENT IN AN ORGANIZED HEALTH CARE EDUCATION/TRAINING PROGRAM

## 2024-03-12 NOTE — PROGRESS NOTES
Puja Hernandez  1985        Subjective     Chief Complaint: Annual/Est Care    History of Present Illness:  Ms. Puja Hernandez is a 39 y.o. female who presents to clinic for est care and annual.     Has been having fatigue, weight gain.   +Night sweats (not new, since 20s).  No fever. No SOB.  Some diarrhea (if drinks salt water) but no constipation.  No palpitations.  No swelling on legs.  No joint pain, no rash.  No change in voice. No compressive symptoms.  +tremors (chronic, worse if nervous).     Takes Vitamin D3 + Gingo     Vitiligo-Seeing Derm, Dr. Joyce. On Opzelura cream. Started about 6m ago.     Thyroid nodule- used to see Endocrine at [facility]. S/p FNA in 2020.  Dr. Raymundo in Endocrine. Had RFA to thyroid in 2-3 years. Per ot, done for size. Was told could do thyroidectomy but declined. Not on any thyroid medication.   Sister (1/2 sister from dad) had thyroid cancer. Unsure which type.   Mom has thyroid nodules.  Dad has Graves' Disease.    Final Pathologic Diagnosis      Abnormal   [facility] Thyroid Cytology Category: Follicular Lesion of Undetermined  Significance (FLUS)  Other findings and comments:  Benign follicular epithelial cells.  Colloid present.  Cytologic evaluation reveals small round bland appearing follicular thyroid  cells arranged predominantly in microfollicles and macrofollicles.  No  cellular atypia is noted.  No nuclear grooves or nuclear pseudoinclusions are  identified.  The specimen is not significantly cellular; however, due to the  finding of prominent microfollicle and microfollicle formation, this case is  best classified as a follicular lesion of undetermined significance (FLUS).  The differential diagnosis includes hyperplastic adenomatoid nodules/nodular  hyperplasia, follicular adenoma, follicular carcinoma, and papillary thyroid  carcinoma.  Repeat sampling at a clinically designated interval may be useful  if clinical concern continues.           Has  hx of thrombocytopenia, anemia, neutropenia.   Last checked in 2020. Remembers being told borderline anemia when she was 16 yrs old.     Had mammogram last year in 12/2023. Negative. Birads 1.  High TC risk. Reports had lump in breast. Left breast.   Has not felt any lumps recently.     Needs new OBGYN. Pap in 2020.    Review of Systems   Constitutional:  Positive for diaphoresis and malaise/fatigue. Negative for chills, fever and weight loss.   HENT:  Negative for congestion.    Respiratory:  Negative for cough, sputum production, shortness of breath and wheezing.    Cardiovascular:  Negative for chest pain, palpitations and leg swelling.   Gastrointestinal:  Positive for diarrhea. Negative for abdominal pain, constipation, nausea and vomiting.   Genitourinary:  Negative for dysuria and urgency.   Neurological:  Positive for tremors.   Psychiatric/Behavioral:  The patient is not nervous/anxious.         PAST HISTORY:     Past Medical History:   Diagnosis Date    Left thyroid nodule     Vitiligo        Past Surgical History:   Procedure Laterality Date    WISDOM TOOTH EXTRACTION         Family History   Problem Relation Age of Onset    Fibroids Mother     Hypertension Mother     Thyroid nodules Mother     Graves' disease Father     Thyroid disease Father     Thyroid cancer Sister     Hypertension Maternal Grandmother     Colon cancer Paternal Grandmother     Diabetes Maternal Aunt     Breast cancer Paternal Aunt     Ovarian cancer Neg Hx     Melanoma Neg Hx        Social History     Socioeconomic History    Marital status: Single   Occupational History    Occupation: marketing   Tobacco Use    Smoking status: Never    Smokeless tobacco: Never   Substance and Sexual Activity    Alcohol use: Yes     Comment: socially    Drug use: No    Sexual activity: Not Currently     Partners: Male     Birth control/protection: None   Other Topics Concern    Are you pregnant or think you may be? No    Breast-feeding No     Social  Determinants of Health     Financial Resource Strain: Low Risk  (2/6/2024)    Overall Financial Resource Strain (CARDIA)     Difficulty of Paying Living Expenses: Not very hard   Food Insecurity: No Food Insecurity (2/6/2024)    Hunger Vital Sign     Worried About Running Out of Food in the Last Year: Never true     Ran Out of Food in the Last Year: Never true   Transportation Needs: No Transportation Needs (2/6/2024)    PRAPARE - Transportation     Lack of Transportation (Medical): No     Lack of Transportation (Non-Medical): No   Physical Activity: Insufficiently Active (2/6/2024)    Exercise Vital Sign     Days of Exercise per Week: 3 days     Minutes of Exercise per Session: 30 min   Stress: No Stress Concern Present (2/6/2024)    [employer] [employer] - Occupational Stress Questionnaire     Feeling of Stress : Only a little   Social Connections: Unknown (2/6/2024)    Social Connection and Isolation Panel [NHANES]     Frequency of Communication with Friends and Family: More than three times a week     Frequency of Social Gatherings with Friends and Family: Once a week     Active Member of Clubs or Organizations: No     Attends Club or Organization Meetings: Never     Marital Status: Never    Housing Stability: Low Risk  (2/6/2024)    Housing Stability Vital Sign     Unable to Pay for Housing in the Last Year: No     Number of Places Lived in the Last Year: 1     Unstable Housing in the Last Year: No       MEDICATIONS & ALLERGIES:     Current Outpatient Medications on File Prior to Visit   Medication Sig    cholecalciferol, vitamin D3, 1,250 mcg (50,000 unit) capsule     clindamycin phosphate 1% (CLEOCIN T) 1 % gel Apply to face BID prn acne    fluocinonide 0.05% (LIDEX) 0.05 % cream Apply to affected areas of body daily-BID x 1 week on, then 1 week off.    ginkgo biloba leaf extract 120 mg Cap     ruxolitinib 1.5 % Crea Apply to affected areas of face and body twice daily as needed for  "vitiligo.    selenium 200 mcg Cap     tacrolimus (PROTOPIC) 0.1 % ointment Apply to affected areas of face and body BID.    tretinoin (RETIN-A) 0.05 % cream Apply a pea-sized amount to face qhs.    triamcinolone acetonide 0.025% (KENALOG) 0.025 % Oint Apply topically 2 (two) times daily. Until symptoms improve.    [DISCONTINUED] LIDOcaine HCL 2% (XYLOCAINE) 2 % jelly Apply topically as needed (for toe pain).    [DISCONTINUED] tacrolimus (PROTOPIC) 0.1 % ointment Compound tacrolimus 0.1% cream. Apply to affected areas of face and body BID. Safe to use everyday.     No current facility-administered medications on file prior to visit.       Review of patient's allergies indicates:   Allergen Reactions    House dust mite Other (See Comments)       OBJECTIVE:     Vital Signs:  Vitals:    03/12/24 1106   BP: 102/66   BP Location: Right arm   Patient Position: Sitting   BP Method: Medium (Manual)   Pulse: 77   SpO2: 99%   Weight: 79.5 kg (175 lb 4.3 oz)   Height: 5' 7" (1.702 m)       Body mass index is 27.45 kg/m².     Physical Exam:  Physical Exam  Vitals and nursing note reviewed.   Constitutional:       General: She is not in acute distress.     Appearance: Normal appearance. She is not ill-appearing, toxic-appearing or diaphoretic.   HENT:      Head: Normocephalic and atraumatic.      Right Ear: Tympanic membrane, ear canal and external ear normal.      Left Ear: Tympanic membrane, ear canal and external ear normal.      Mouth/Throat:      Mouth: Mucous membranes are moist.      Pharynx: No oropharyngeal exudate or posterior oropharyngeal erythema.   Eyes:      General: No scleral icterus.        Right eye: No discharge.         Left eye: No discharge.      Conjunctiva/sclera: Conjunctivae normal.   Neck:      Thyroid: Thyromegaly present. No thyroid tenderness.        Comments: Goiter on left side of thryoid  Cardiovascular:      Rate and Rhythm: Normal rate and regular rhythm.      Pulses: Normal pulses.      Heart " "sounds: Normal heart sounds. No murmur heard.  Pulmonary:      Effort: Pulmonary effort is normal. No respiratory distress.      Breath sounds: Normal breath sounds. No wheezing.   Musculoskeletal:         General: Normal range of motion.      Cervical back: Normal range of motion and neck supple. No rigidity. No pain with movement. Normal range of motion.      Right lower leg: No edema.      Left lower leg: No edema.   Lymphadenopathy:      Cervical: No cervical adenopathy.      Right cervical: No superficial cervical adenopathy.     Left cervical: No superficial cervical adenopathy.   Skin:     General: Skin is warm and dry.   Neurological:      Mental Status: She is alert and oriented to person, place, and time.      Gait: Gait normal.   Psychiatric:         Mood and Affect: Mood and affect normal.         Behavior: Behavior normal.            Laboratory  Lab Results   Component Value Date    WBC 3.51 (L) 10/13/2020    HGB 11.8 (L) 10/13/2020    HCT 37.4 10/13/2020    MCV 89 10/13/2020     (L) 10/13/2020     Lab Results   Component Value Date    GLU 78 05/25/2018     07/21/2021    K 3.9 07/21/2021     05/25/2018    CO2 26 07/21/2021    BUN 6.0 (L) 07/21/2021    CREATININE 0.72 07/21/2021    CALCIUM 9.7 08/27/2021     No results found for: "INR", "PROTIME"  No results found for: "HGBA1C"        Health Maintenance         Date Due Completion Date    Pneumococcal Vaccines (Age 0-64) (1 of 2 - PCV) Never done ---    TETANUS VACCINE Never done ---    Hemoglobin A1c (Diabetic Prevention Screening) Never done ---    Influenza Vaccine (1) 09/01/2023 12/16/2020    COVID-19 Vaccine (7 - 2023-24 season) 01/01/2024 11/6/2023    Mammogram 12/22/2024 12/22/2023    Cervical Cancer Screening 10/13/2025 10/13/2020              ASSESSMENT & PLAN:   Ms. Puja Hernandez is a 39 y.o. female who was seen today in clinic for est care. Large goiter, sister with thyroid cancer.   Will get US thyroid. May need FNA " again.      1. Annual physical exam  -     CBC Auto Differential; Future; Expected date: 03/12/2024  -     Comprehensive Metabolic Panel; Future; Expected date: 03/12/2024  -     Hemoglobin A1C; Future; Expected date: 03/12/2024  -     Lipid Panel; Future; Expected date: 03/12/2024  -     TSH; Future; Expected date: 03/12/2024  -     Vitamin D; Future; Expected date: 03/12/2024  -     IRON AND TIBC; Future; Expected date: 03/12/2024  -     Ferritin; Future  -     VITAMIN B12; Future; Expected date: 03/12/2024  -     Pathologist Interpretation Differential; Future; Expected date: 03/12/2024  -     Ambulatory referral/consult to Obstetrics / Gynecology    2. Establishing care with new doctor, encounter for  -     CBC Auto Differential; Future; Expected date: 03/12/2024  -     Comprehensive Metabolic Panel; Future; Expected date: 03/12/2024  -     Hemoglobin A1C; Future; Expected date: 03/12/2024  -     Lipid Panel; Future; Expected date: 03/12/2024  -     TSH; Future; Expected date: 03/12/2024  -     Vitamin D; Future; Expected date: 03/12/2024  -     IRON AND TIBC; Future; Expected date: 03/12/2024  -     Ferritin; Future  -     VITAMIN B12; Future; Expected date: 03/12/2024  -     Pathologist Interpretation Differential; Future; Expected date: 03/12/2024    3. Chronic fatigue  -     US Soft Tissue Head Neck; Future; Expected date: 03/12/2024  -     THYROID PEROXIDASE ANTIBODY; Future; Expected date: 03/12/2024  -     THYROGLOBULIN AB SCREEN; Future; Expected date: 03/12/2024  -     X-Ray Chest PA And Lateral; Future; Expected date: 03/12/2024  -     CBC Auto Differential; Future; Expected date: 03/12/2024  -     Comprehensive Metabolic Panel; Future; Expected date: 03/12/2024  -     Hemoglobin A1C; Future; Expected date: 03/12/2024  -     Lipid Panel; Future; Expected date: 03/12/2024  -     TSH; Future; Expected date: 03/12/2024  -     Vitamin D; Future; Expected date: 03/12/2024  -     IRON AND TIBC; Future;  Expected date: 03/12/2024  -     Ferritin; Future  -     VITAMIN B12; Future; Expected date: 03/12/2024  -     Pathologist Interpretation Differential; Future; Expected date: 03/12/2024    4. Night sweats  -     US Soft Tissue Head Neck; Future; Expected date: 03/12/2024  -     THYROID PEROXIDASE ANTIBODY; Future; Expected date: 03/12/2024  -     THYROGLOBULIN AB SCREEN; Future; Expected date: 03/12/2024  -     X-Ray Chest PA And Lateral; Future; Expected date: 03/12/2024  -     CBC Auto Differential; Future; Expected date: 03/12/2024  -     Comprehensive Metabolic Panel; Future; Expected date: 03/12/2024  -     Hemoglobin A1C; Future; Expected date: 03/12/2024  -     Lipid Panel; Future; Expected date: 03/12/2024  -     TSH; Future; Expected date: 03/12/2024  -     Vitamin D; Future; Expected date: 03/12/2024  -     IRON AND TIBC; Future; Expected date: 03/12/2024  -     Ferritin; Future  -     VITAMIN B12; Future; Expected date: 03/12/2024  -     Pathologist Interpretation Differential; Future; Expected date: 03/12/2024    5. Vitiligo  -     CBC Auto Differential; Future; Expected date: 03/12/2024  -     Comprehensive Metabolic Panel; Future; Expected date: 03/12/2024  -     Hemoglobin A1C; Future; Expected date: 03/12/2024  -     Lipid Panel; Future; Expected date: 03/12/2024  -     TSH; Future; Expected date: 03/12/2024  -     Vitamin D; Future; Expected date: 03/12/2024  -     IRON AND TIBC; Future; Expected date: 03/12/2024  -     Ferritin; Future  -     VITAMIN B12; Future; Expected date: 03/12/2024  -     Pathologist Interpretation Differential; Future; Expected date: 03/12/2024    6. Thyroid nodule  -     US Soft Tissue Head Neck; Future; Expected date: 03/12/2024  -     THYROID PEROXIDASE ANTIBODY; Future; Expected date: 03/12/2024  -     THYROGLOBULIN AB SCREEN; Future; Expected date: 03/12/2024  -     CBC Auto Differential; Future; Expected date: 03/12/2024  -     Comprehensive Metabolic Panel; Future;  Expected date: 03/12/2024  -     Hemoglobin A1C; Future; Expected date: 03/12/2024  -     Lipid Panel; Future; Expected date: 03/12/2024  -     TSH; Future; Expected date: 03/12/2024  -     Vitamin D; Future; Expected date: 03/12/2024  -     IRON AND TIBC; Future; Expected date: 03/12/2024  -     Ferritin; Future  -     VITAMIN B12; Future; Expected date: 03/12/2024  -     Pathologist Interpretation Differential; Future; Expected date: 03/12/2024    7. Thrombocytopenia  -     CBC Auto Differential; Future; Expected date: 03/12/2024  -     Comprehensive Metabolic Panel; Future; Expected date: 03/12/2024  -     Hemoglobin A1C; Future; Expected date: 03/12/2024  -     Lipid Panel; Future; Expected date: 03/12/2024  -     TSH; Future; Expected date: 03/12/2024  -     Vitamin D; Future; Expected date: 03/12/2024  -     IRON AND TIBC; Future; Expected date: 03/12/2024  -     Ferritin; Future  -     VITAMIN B12; Future; Expected date: 03/12/2024  -     Pathologist Interpretation Differential; Future; Expected date: 03/12/2024    8. Lymphocytopenia  -     CBC Auto Differential; Future; Expected date: 03/12/2024  -     Comprehensive Metabolic Panel; Future; Expected date: 03/12/2024  -     Hemoglobin A1C; Future; Expected date: 03/12/2024  -     Lipid Panel; Future; Expected date: 03/12/2024  -     TSH; Future; Expected date: 03/12/2024  -     Vitamin D; Future; Expected date: 03/12/2024  -     IRON AND TIBC; Future; Expected date: 03/12/2024  -     Ferritin; Future  -     VITAMIN B12; Future; Expected date: 03/12/2024  -     Pathologist Interpretation Differential; Future; Expected date: 03/12/2024    9. Family history of thyroid cancer  -     US Soft Tissue Head Neck; Future; Expected date: 03/12/2024  -     THYROID PEROXIDASE ANTIBODY; Future; Expected date: 03/12/2024  -     THYROGLOBULIN AB SCREEN; Future; Expected date: 03/12/2024  -     CBC Auto Differential; Future; Expected date: 03/12/2024  -     Comprehensive  Metabolic Panel; Future; Expected date: 03/12/2024  -     Hemoglobin A1C; Future; Expected date: 03/12/2024  -     Lipid Panel; Future; Expected date: 03/12/2024  -     TSH; Future; Expected date: 03/12/2024  -     Vitamin D; Future; Expected date: 03/12/2024  -     IRON AND TIBC; Future; Expected date: 03/12/2024  -     Ferritin; Future  -     VITAMIN B12; Future; Expected date: 03/12/2024  -     Pathologist Interpretation Differential; Future; Expected date: 03/12/2024    10. At high risk for breast cancer  -     Ambulatory referral/consult to Breast Surgery; Future; Expected date: 03/19/2024           Aleisha Handley MD  Internal Medicine         Portions of this note may have been generated using voice recognition software.  Please excuse any spelling/grammatical errors. Occasional wrong-word or sound-a-like substitutions may have also occurred due to the inherent limitations of voice recognition software. Please read the chart carefully and recognize, using context, where substitutions have occurred.

## 2024-03-14 DIAGNOSIS — Z80.8 FAMILY HISTORY OF THYROID CANCER: ICD-10-CM

## 2024-03-14 DIAGNOSIS — D72.820 ATYPICAL LYMPHOCYTOSIS: Primary | ICD-10-CM

## 2024-03-14 DIAGNOSIS — E04.9 GOITER: ICD-10-CM

## 2024-03-14 DIAGNOSIS — L80 VITILIGO: ICD-10-CM

## 2024-03-14 DIAGNOSIS — R61 NIGHT SWEATS: ICD-10-CM

## 2024-03-14 DIAGNOSIS — E05.90 SUBCLINICAL HYPERTHYROIDISM: ICD-10-CM

## 2024-03-19 ENCOUNTER — TELEPHONE (OUTPATIENT)
Dept: OBSTETRICS AND GYNECOLOGY | Facility: CLINIC | Age: 39
End: 2024-03-19
Payer: COMMERCIAL

## 2024-03-19 ENCOUNTER — TELEPHONE (OUTPATIENT)
Dept: PRIMARY CARE CLINIC | Facility: CLINIC | Age: 39
End: 2024-03-19
Payer: COMMERCIAL

## 2024-03-19 NOTE — TELEPHONE ENCOUNTER
----- Message from Aleisha Handley MD sent at 3/19/2024  1:24 PM CDT -----  Hi, looks like we were unable to reach her last time. Can we try seeing if she can do a virtual f/u this week?    From last result note-->    She has a few abnormalities that I would like to discuss further.  Can we do a virtual next week?  Her white blood cell count is low and her lymph percentage is high.  Her peripheral smear shows some atypical lymphocytes so I want to put in a referral to Hematology.  We can talk more about this on our virtual in detail.  Additionally, it looks like her vitamin-D is extremely high.  Is she on any vitamin-D supplements?  If so I would recommend stopping totally.  Her B12 is also low.  I would recommend a daily B12 supplement.  About a 1000 mcg daily.       We should get her into Endocrine as well.  I have placed both referrals.

## 2024-03-21 ENCOUNTER — OFFICE VISIT (OUTPATIENT)
Dept: PRIMARY CARE CLINIC | Facility: CLINIC | Age: 39
End: 2024-03-21
Payer: COMMERCIAL

## 2024-03-21 DIAGNOSIS — L80 VITILIGO: ICD-10-CM

## 2024-03-21 DIAGNOSIS — E04.1 THYROID NODULE: ICD-10-CM

## 2024-03-21 DIAGNOSIS — E53.8 B12 DEFICIENCY: Primary | ICD-10-CM

## 2024-03-21 DIAGNOSIS — E04.9 GOITER: ICD-10-CM

## 2024-03-21 DIAGNOSIS — Z80.8 FAMILY HISTORY OF THYROID CANCER: ICD-10-CM

## 2024-03-21 DIAGNOSIS — R79.89 HIGH SERUM VITAMIN D: ICD-10-CM

## 2024-03-21 DIAGNOSIS — D72.820 ATYPICAL LYMPHOCYTOSIS: ICD-10-CM

## 2024-03-21 DIAGNOSIS — E05.90 SUBCLINICAL HYPERTHYROIDISM: ICD-10-CM

## 2024-03-21 DIAGNOSIS — T75.4XXA ELECTROCUTION: ICD-10-CM

## 2024-03-21 PROCEDURE — 99214 OFFICE O/P EST MOD 30 MIN: CPT | Mod: 95,,, | Performed by: STUDENT IN AN ORGANIZED HEALTH CARE EDUCATION/TRAINING PROGRAM
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
## 2024-03-23 ENCOUNTER — OFFICE VISIT (OUTPATIENT)
Dept: URGENT CARE | Facility: CLINIC | Age: 39
End: 2024-03-23
Payer: COMMERCIAL

## 2024-03-23 VITALS
DIASTOLIC BLOOD PRESSURE: 70 MMHG | WEIGHT: 175.25 LBS | HEIGHT: 67 IN | HEART RATE: 62 BPM | RESPIRATION RATE: 18 BRPM | OXYGEN SATURATION: 99 % | TEMPERATURE: 98 F | SYSTOLIC BLOOD PRESSURE: 109 MMHG | BODY MASS INDEX: 27.51 KG/M2

## 2024-03-23 DIAGNOSIS — T75.4XXD ELECTROCUTION AND NONFATAL EFFECTS OF ELECTRIC CURRENT, SUBSEQUENT ENCOUNTER: Primary | ICD-10-CM

## 2024-03-23 DIAGNOSIS — H57.9 EYE PRESSURE: ICD-10-CM

## 2024-03-23 PROCEDURE — 93005 ELECTROCARDIOGRAM TRACING: CPT | Mod: S$GLB,,, | Performed by: FAMILY MEDICINE

## 2024-03-23 PROCEDURE — 99214 OFFICE O/P EST MOD 30 MIN: CPT | Mod: S$GLB,,, | Performed by: FAMILY MEDICINE

## 2024-03-23 PROCEDURE — 93010 ELECTROCARDIOGRAM REPORT: CPT | Mod: S$GLB,,, | Performed by: INTERNAL MEDICINE
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
## 2024-03-24 LAB
OHS QRS DURATION: 78 MS
OHS QTC CALCULATION: 436 MS

## 2025-02-19 DIAGNOSIS — Z12.31 OTHER SCREENING MAMMOGRAM: ICD-10-CM

## 2025-06-18 ENCOUNTER — OFFICE VISIT (OUTPATIENT)
Dept: PRIMARY CARE CLINIC | Facility: CLINIC | Age: 40
End: 2025-06-18
Payer: MEDICAID

## 2025-06-18 VITALS
BODY MASS INDEX: 28.37 KG/M2 | HEIGHT: 67 IN | DIASTOLIC BLOOD PRESSURE: 78 MMHG | SYSTOLIC BLOOD PRESSURE: 127 MMHG | HEART RATE: 86 BPM | WEIGHT: 180.75 LBS | OXYGEN SATURATION: 99 %

## 2025-06-18 DIAGNOSIS — E05.90 SUBCLINICAL HYPERTHYROIDISM: ICD-10-CM

## 2025-06-18 DIAGNOSIS — Z00.00 ANNUAL PHYSICAL EXAM: Primary | ICD-10-CM

## 2025-06-18 DIAGNOSIS — N94.6 DYSMENORRHEA: ICD-10-CM

## 2025-06-18 DIAGNOSIS — E04.9 GOITER: ICD-10-CM

## 2025-06-18 DIAGNOSIS — E66.3 OVERWEIGHT WITH BODY MASS INDEX (BMI) OF 28 TO 28.9 IN ADULT: ICD-10-CM

## 2025-06-18 DIAGNOSIS — Z00.00 ANNUAL PHYSICAL EXAM: ICD-10-CM

## 2025-06-18 DIAGNOSIS — R53.82 CHRONIC FATIGUE: ICD-10-CM

## 2025-06-18 DIAGNOSIS — Z12.31 SCREENING MAMMOGRAM FOR BREAST CANCER: ICD-10-CM

## 2025-06-18 PROCEDURE — 3008F BODY MASS INDEX DOCD: CPT | Mod: CPTII,,,

## 2025-06-18 PROCEDURE — 3078F DIAST BP <80 MM HG: CPT | Mod: CPTII,,,

## 2025-06-18 PROCEDURE — 99214 OFFICE O/P EST MOD 30 MIN: CPT | Mod: S$PBB,,,

## 2025-06-18 PROCEDURE — 99215 OFFICE O/P EST HI 40 MIN: CPT | Mod: PBBFAC,PN

## 2025-06-18 PROCEDURE — 3044F HG A1C LEVEL LT 7.0%: CPT | Mod: CPTII,,,

## 2025-06-18 PROCEDURE — 1159F MED LIST DOCD IN RCRD: CPT | Mod: CPTII,,,

## 2025-06-18 PROCEDURE — 1160F RVW MEDS BY RX/DR IN RCRD: CPT | Mod: CPTII,,,

## 2025-06-18 PROCEDURE — 3074F SYST BP LT 130 MM HG: CPT | Mod: CPTII,,,

## 2025-06-18 PROCEDURE — 99999 PR PBB SHADOW E&M-EST. PATIENT-LVL V: CPT | Mod: PBBFAC,,,

## 2025-06-18 NOTE — PROGRESS NOTES
Subjective     Patient ID: Puja Hernandez is a 40 y.o. female.      History of Present Illness    CHIEF COMPLAINT:  Patient presents today for annual follow-up    THYROID:  She has a visible goiter, currently at its largest size. She underwent radio frequency ablation (RFA) two years ago, which initially resulted in thyroid shrinkage but subsequently regrew. Thyroid levels consistently return normal. Family history is positive for thyroid nodules in her mother.    FATIGUE:  She reports ongoing fatigue for approximately 2 years, describing constant tiredness despite getting 9-10 hours of sleep per night. She experiences occasional nighttime awakening and fluctuating energy levels, alternating between good days and complete exhaustion.    MENSTRUAL:  She reports regular menstrual periods with worsening severe cramping and heavy bleeding. Last menstrual period was May 28th through June 1st.      ROS:  General: -fever, -chills, +fatigue, -weight gain, -weight loss, +difficulty staying asleep, +sleep disturbances  Eyes: -vision changes, -redness, -discharge  ENT: -ear pain, -nasal congestion, -sore throat  Cardiovascular: -chest pain, -palpitations, -lower extremity edema  Respiratory: -cough, -shortness of breath  Gastrointestinal: -abdominal pain, -nausea, -vomiting, -diarrhea, -constipation, -blood in stool  Genitourinary: -dysuria, -hematuria, -frequency  Musculoskeletal: -joint pain, -muscle pain  Skin: -rash, -lesion  Neurological: -headache, -dizziness, -numbness, -tingling  Psychiatric: -anxiety, -depression, -sleep difficulty  Allergic: +allergic reactions  Female Genitourinary: +menstrual pain or symptoms, +excessive cramping, +excessive vaginal bleeding         Past Medical History:   Diagnosis Date    Left thyroid nodule     Vitiligo      Past Surgical History:   Procedure Laterality Date    WISDOM TOOTH EXTRACTION       Social History[1]  Review of patient's allergies indicates:   Allergen Reactions     "House dust mite Other (See Comments)     Problem List[2]       Objective   Vitals:    06/18/25 1019   BP: 127/78   BP Location: Right arm   Patient Position: Sitting   Pulse: 86   SpO2: 99%   Weight: 82 kg (180 lb 12.4 oz)   Height: 5' 7" (1.702 m)       Physical Exam  Constitutional:       General: She is not in acute distress.     Appearance: Normal appearance.   HENT:      Head: Normocephalic and atraumatic.      Right Ear: Tympanic membrane, ear canal and external ear normal.      Left Ear: Tympanic membrane, ear canal and external ear normal.      Nose: Nose normal.      Mouth/Throat:      Mouth: Mucous membranes are moist.      Pharynx: Oropharynx is clear. No oropharyngeal exudate or posterior oropharyngeal erythema.   Eyes:      Extraocular Movements: Extraocular movements intact.      Conjunctiva/sclera: Conjunctivae normal.      Pupils: Pupils are equal, round, and reactive to light.   Neck:      Thyroid: Thyromegaly present.   Cardiovascular:      Rate and Rhythm: Normal rate and regular rhythm.      Pulses: Normal pulses.      Heart sounds: Normal heart sounds.   Pulmonary:      Effort: Pulmonary effort is normal. No respiratory distress.      Breath sounds: Normal breath sounds.   Abdominal:      General: Bowel sounds are normal.      Palpations: Abdomen is soft.   Musculoskeletal:         General: Normal range of motion.      Cervical back: Normal range of motion.      Right lower leg: No edema.      Left lower leg: No edema.   Skin:     General: Skin is warm and dry.      Capillary Refill: Capillary refill takes less than 2 seconds.      Findings: No rash.   Neurological:      Mental Status: She is alert and oriented to person, place, and time.   Psychiatric:         Mood and Affect: Mood normal.         Behavior: Behavior normal.              Assessment and Plan     1. Annual physical exam  -     CBC Auto Differential; Future; Expected date: 06/18/2025  -     Comprehensive Metabolic Panel; Future; " Expected date: 06/18/2025  -     Hemoglobin A1C; Future; Expected date: 06/18/2025  -     Lipid Panel; Future; Expected date: 06/18/2025  -     TSH; Future; Expected date: 06/18/2025    2. Chronic fatigue  -     CBC Auto Differential; Future; Expected date: 06/18/2025  -     Comprehensive Metabolic Panel; Future; Expected date: 06/18/2025  -     Hemoglobin A1C; Future; Expected date: 06/18/2025  -     TSH; Future; Expected date: 06/18/2025    3. Goiter  -     TSH; Future; Expected date: 06/18/2025  -     Ambulatory referral/consult to Endocrinology; Future; Expected date: 06/25/2025  -     US Soft Tissue Head Neck; Future; Expected date: 06/18/2025    4. Subclinical hyperthyroidism  -     TSH; Future; Expected date: 06/18/2025  -     Ambulatory referral/consult to Endocrinology; Future; Expected date: 06/25/2025    5. Dysmenorrhea  -     Ambulatory referral/consult to Obstetrics / Gynecology; Future; Expected date: 06/25/2025    6. Screening mammogram for breast cancer  -     Mammo Digital Screening Bilat w/ Valente (XPD); Future; Expected date: 06/18/2025    7. Overweight with body mass index (BMI) of 28 to 28.9 in adult        Medications Ordered Prior to Encounter[3]         Follow up in about 3 months (around 9/18/2025), or sooner if interference of airway, breathing; seek immediate medical attention (emergency room)..    Lori A. Stephens Sandifer, FNP-C    This note was generated with the assistance of ambient listening technology. Verbal consent was obtained by the patient for the recording of patient appointment to facilitate this note. I attest to having reviewed and edited the generated note for accuracy, though some syntax or spelling errors may persist. Please contact the author of this note for any clarification.         [1]   Social History  Socioeconomic History    Marital status: Single   Occupational History    Occupation: marketing   Tobacco Use    Smoking status: Never    Smokeless tobacco: Never    Substance and Sexual Activity    Alcohol use: Yes     Comment: socially    Drug use: No    Sexual activity: Not Currently     Partners: Male     Birth control/protection: None   Other Topics Concern    Are you pregnant or think you may be? No    Breast-feeding No     Social Drivers of Health     Financial Resource Strain: Medium Risk (6/11/2025)    Overall Financial Resource Strain (CARDIA)     Difficulty of Paying Living Expenses: Somewhat hard   Food Insecurity: No Food Insecurity (6/11/2025)    Hunger Vital Sign     Worried About Running Out of Food in the Last Year: Never true     Ran Out of Food in the Last Year: Never true   Transportation Needs: No Transportation Needs (6/11/2025)    PRAPARE - Transportation     Lack of Transportation (Medical): No     Lack of Transportation (Non-Medical): No   Physical Activity: Inactive (6/11/2025)    Exercise Vital Sign     Days of Exercise per Week: 0 days     Minutes of Exercise per Session: 0 min   Stress: Stress Concern Present (6/11/2025)    [employer] [place] [employer] - Occupational Stress Questionnaire     Feeling of Stress : To some extent   Housing Stability: Low Risk  (6/11/2025)    Housing Stability Vital Sign     Unable to Pay for Housing in the Last Year: No     Homeless in the Last Year: No   [2]   Patient Active Problem List  Diagnosis    Thyroid nodule    Lymphocytopenia    Thrombocytopenia    Vitiligo    Chronic fatigue    Night sweats    Family history of thyroid cancer    Atypical lymphocytosis    Goiter    Subclinical hyperthyroidism    B12 deficiency    High serum vitamin D    Electrocution   [3]   Current Outpatient Medications on File Prior to Visit   Medication Sig Dispense Refill    ruxolitinib 1.5 % Crea Apply to affected areas of face and body twice daily as needed for vitiligo. 60 g 3     No current facility-administered medications on file prior to visit.

## 2025-06-20 ENCOUNTER — HOSPITAL ENCOUNTER (OUTPATIENT)
Dept: RADIOLOGY | Facility: OTHER | Age: 40
Discharge: HOME | End: 2025-06-20
Payer: MEDICAID

## 2025-06-20 ENCOUNTER — TELEPHONE (OUTPATIENT)
Dept: RADIOLOGY | Facility: HOSPITAL | Age: 40
End: 2025-06-20

## 2025-06-20 DIAGNOSIS — E04.9 GOITER: ICD-10-CM

## 2025-06-20 PROCEDURE — 76536 US EXAM OF HEAD AND NECK: CPT | Mod: 26,,, | Performed by: INTERNAL MEDICINE

## 2025-06-20 PROCEDURE — 76536 US EXAM OF HEAD AND NECK: CPT | Mod: TC

## 2025-06-20 NOTE — TELEPHONE ENCOUNTER
Returned patients call to reschedule mammogram, no answer. Left message with my contact information.

## 2025-07-02 ENCOUNTER — HOSPITAL ENCOUNTER (OUTPATIENT)
Dept: RADIOLOGY | Facility: HOSPITAL | Age: 40
Discharge: HOME | End: 2025-07-02
Payer: MEDICAID

## 2025-07-02 ENCOUNTER — PATIENT MESSAGE (OUTPATIENT)
Dept: DERMATOLOGY | Facility: CLINIC | Age: 40
End: 2025-07-02
Payer: MEDICAID

## 2025-07-02 DIAGNOSIS — Z12.31 SCREENING MAMMOGRAM FOR BREAST CANCER: ICD-10-CM

## 2025-07-02 PROCEDURE — 77067 SCR MAMMO BI INCL CAD: CPT | Mod: TC

## 2025-07-09 ENCOUNTER — PATIENT MESSAGE (OUTPATIENT)
Dept: PRIMARY CARE CLINIC | Facility: CLINIC | Age: 40
End: 2025-07-09
Payer: MEDICAID

## 2025-07-22 ENCOUNTER — OFFICE VISIT (OUTPATIENT)
Dept: DERMATOLOGY | Facility: CLINIC | Age: 40
End: 2025-07-22
Payer: MEDICAID

## 2025-07-22 DIAGNOSIS — L80 VITILIGO: ICD-10-CM

## 2025-07-22 PROCEDURE — 1160F RVW MEDS BY RX/DR IN RCRD: CPT | Mod: CPTII,95,, | Performed by: DERMATOLOGY

## 2025-07-22 PROCEDURE — G2211 COMPLEX E/M VISIT ADD ON: HCPCS | Mod: 95,,, | Performed by: DERMATOLOGY

## 2025-07-22 PROCEDURE — 3044F HG A1C LEVEL LT 7.0%: CPT | Mod: CPTII,95,, | Performed by: DERMATOLOGY

## 2025-07-22 PROCEDURE — 1159F MED LIST DOCD IN RCRD: CPT | Mod: CPTII,95,, | Performed by: DERMATOLOGY

## 2025-07-22 PROCEDURE — 98006 SYNCH AUDIO-VIDEO EST MOD 30: CPT | Mod: 95,,, | Performed by: DERMATOLOGY

## 2025-07-22 RX ORDER — DEXAMETHASONE 4 MG/1
TABLET ORAL
Qty: 16 TABLET | Refills: 0 | Status: SHIPPED | OUTPATIENT
Start: 2025-07-22 | End: 2025-07-22

## 2025-07-22 RX ORDER — TACROLIMUS 1 MG/G
OINTMENT TOPICAL
Qty: 100 G | Refills: 5 | Status: SHIPPED | OUTPATIENT
Start: 2025-07-22

## 2025-07-22 RX ORDER — DEXAMETHASONE 4 MG/1
TABLET ORAL
Qty: 16 TABLET | Refills: 0 | Status: SHIPPED | OUTPATIENT
Start: 2025-07-22

## 2025-07-22 RX ORDER — TACROLIMUS 1 MG/G
OINTMENT TOPICAL
Qty: 100 G | Refills: 5 | Status: SHIPPED | OUTPATIENT
Start: 2025-07-22 | End: 2025-07-22

## 2025-07-22 NOTE — PROGRESS NOTES
The patient location is: [place]  The chief complaint leading to consultation is: vitiligo  Visit type: virtual visit with synchronous audio and video  Total time spent with patient: 25 minutes  Each patient to whom I provide medical services by telemedicine is:  (1) informed of the relationship between the physician and patient and the respective role of any other health care provider with respect to management of the patient; and (2) notified that he or she may decline to receive medical services by telemedicine and may withdraw from such care at any time.     Patient Information  Name: Puja Hernandez  : 1985  MRN: [id number]     Referring Physician:  No ref. provider found   Primary Care Physician:  Aleisha Handley MD   Date of Visit: 25      Subjective:     History of Present lllness:    Puja Hernandez is a 40 y.o. female who presents with a chief complaint of vitiligo.    Location: face, neck, R shoulder, B/L axilla, elbows, wrist, fingers, chest, back, groin, abdomen and legs   Duration: > 1 yr,   Signs/Symptoms: spreading x 2 wks  Exacerbating factors: stress  Relieving factors/Prior treatments: nbUVB, protopic ointment, Opzelura; has previously tried and failed systemic and topical steroids.     Patient was last seen: 2023.  Prior notes by myself reviewed.   Clinical documentation obtained by nursing staff reviewed.    Review of Systems   Constitutional:  Positive for weight gain (20-30 lbs over 4 yrs) and fatigue. Negative for weight loss.   Cardiovascular:  Negative for chest pain and palpitations.   Gastrointestinal:  Negative for abdominal pain and diarrhea.   Psychiatric/Behavioral:  Positive for anxiety.    Endocrine: Positive for heat intolerance.        Has appointment with endocrinologist at [facility] next month for enlarging goiter (previously tx'd with RFA)       Objective:   Physical Exam     Diagram Legend     Erythematous scaling macule/papule c/w actinic keratosis        Vascular papule c/w angioma      Pigmented verrucoid papule/plaque c/w seborrheic keratosis      Yellow umbilicated papule c/w sebaceous hyperplasia      Irregularly shaped tan macule c/w lentigo     1-2 mm smooth white papules consistent with Milia      Movable subcutaneous cyst with punctum c/w epidermal inclusion cyst      Subcutaneous movable cyst c/w pilar cyst      Firm pink to brown papule c/w dermatofibroma      Pedunculated fleshy papule(s) c/w skin tag(s)      Evenly pigmented macule c/w junctional nevus     Mildly variegated pigmented, slightly irregular-bordered macule c/w mildly atypical nevus      Flesh colored to evenly pigmented papule c/w intradermal nevus       Pink pearly papule/plaque c/w basal cell carcinoma      Erythematous hyperkeratotic cursted plaque c/w SCC      Surgical scar with no sign of skin cancer recurrence      Open and closed comedones      Inflammatory papules and pustules      Verrucoid papule consistent consistent with wart     Erythematous eczematous patches and plaques     Dystrophic onycholytic nail with subungual debris c/w onychomycosis     Umbilicated papule    Erythematous-base heme-crusted tan verrucoid plaque consistent with inflamed seborrheic keratosis     Erythematous Silvery Scaling Plaque c/w Psoriasis     See annotation              [] Data reviewed  [] Prior external notes reviewed  [] Independent review of test  [] Management discussed with another provider  [] Independent historian    Assessment / Plan:        Vitiligo  - chronic problem with exacerbation/progression  Due to the recent worsening, will be aggressive in treatment in an attempt to halt progression.   -     dexAMETHasone (DECADRON) 4 MG Tab; Take one tab PO every Saturday and Sunday x 8 weeks. Take in the morning with breakfast.  Dispense: 16 tablet; Refill: 0  -     ruxolitinib 1.5 % Crea; Apply to affected areas of face and body twice daily as needed for vitiligo.  Dispense: 60 g; Refill: 3  -      tacrolimus (PROTOPIC) 0.1 % ointment; Apply to affected areas of face and body BID.  Dispense: 100 g; Refill: 5    Discussed other treatment options for vitiligo, including oral BONI inhibitors.   Pt would like to restart nbUVB as this usually works to repigment her skin. Will try to get her established with [facility] at [facility] for nbUVB.    Keep follow up appointment with endocrine as her thyroid disorder that may be contributing to the recent flare of vitiligo.      Follow up in about 3 months (around 10/22/2025) for follow up, or sooner if symptoms worsening or not improving.      Rebekah Joyce MD, FAAD  [facility]

## 2025-07-28 ENCOUNTER — HOSPITAL ENCOUNTER (OUTPATIENT)
Dept: RADIOLOGY | Facility: HOSPITAL | Age: 40
Discharge: HOME | End: 2025-07-28
Payer: MEDICAID

## 2025-07-28 ENCOUNTER — OFFICE VISIT (OUTPATIENT)
Dept: OBSTETRICS AND GYNECOLOGY | Facility: CLINIC | Age: 40
End: 2025-07-28
Payer: MEDICAID

## 2025-07-28 VITALS
DIASTOLIC BLOOD PRESSURE: 68 MMHG | BODY MASS INDEX: 27.86 KG/M2 | WEIGHT: 177.94 LBS | SYSTOLIC BLOOD PRESSURE: 118 MMHG

## 2025-07-28 DIAGNOSIS — N94.6 DYSMENORRHEA: ICD-10-CM

## 2025-07-28 DIAGNOSIS — Z12.39 SCREENING BREAST EXAMINATION: ICD-10-CM

## 2025-07-28 DIAGNOSIS — Z01.419 ENCOUNTER FOR GYNECOLOGICAL EXAMINATION WITHOUT ABNORMAL FINDING: Primary | ICD-10-CM

## 2025-07-28 DIAGNOSIS — E04.9 GOITER: ICD-10-CM

## 2025-07-28 DIAGNOSIS — Z80.8 FAMILY HISTORY OF THYROID CANCER: ICD-10-CM

## 2025-07-28 PROCEDURE — 76856 US EXAM PELVIC COMPLETE: CPT | Mod: TC

## 2025-07-28 PROCEDURE — 99999 PR PBB SHADOW E&M-EST. PATIENT-LVL II: CPT | Mod: PBBFAC,,,

## 2025-07-28 PROCEDURE — 1159F MED LIST DOCD IN RCRD: CPT | Mod: CPTII,,,

## 2025-07-28 PROCEDURE — 3008F BODY MASS INDEX DOCD: CPT | Mod: CPTII,,,

## 2025-07-28 PROCEDURE — 99212 OFFICE O/P EST SF 10 MIN: CPT | Mod: PBBFAC

## 2025-07-28 PROCEDURE — 87491 CHLMYD TRACH DNA AMP PROBE: CPT

## 2025-07-28 PROCEDURE — 76856 US EXAM PELVIC COMPLETE: CPT | Mod: 26,,, | Performed by: RADIOLOGY

## 2025-07-28 PROCEDURE — 3078F DIAST BP <80 MM HG: CPT | Mod: CPTII,,,

## 2025-07-28 PROCEDURE — 3044F HG A1C LEVEL LT 7.0%: CPT | Mod: CPTII,,,

## 2025-07-28 PROCEDURE — 3074F SYST BP LT 130 MM HG: CPT | Mod: CPTII,,,

## 2025-07-28 PROCEDURE — 88175 CYTOPATH C/V AUTO FLUID REDO: CPT | Mod: TC

## 2025-07-28 PROCEDURE — 76830 TRANSVAGINAL US NON-OB: CPT | Mod: 26,,, | Performed by: RADIOLOGY

## 2025-07-28 PROCEDURE — 81515 NFCT DS BV&VAGINITIS DNA ALG: CPT

## 2025-07-28 PROCEDURE — 87624 HPV HI-RISK TYP POOLED RSLT: CPT

## 2025-07-28 PROCEDURE — 99386 PREV VISIT NEW AGE 40-64: CPT | Mod: S$PBB,,,

## 2025-07-28 NOTE — PROGRESS NOTES
CC: Annual  HISTORY OF PRESENT ILLNESS:    Puja Hernandez is a 40 y.o. female, , Patient's last menstrual period was 2025 (exact date).,  presents for a routine exam and has no complaints.   She is NOT CURRENTLY sexually active- LAST ENCOUNTER . She uses ABSTINENCE for contraception.  History of STDs: DENIES.  She does want STD screening.  Denies any other GYN complaints.      The patient participates in regular exercise: NO.  The patient does not smoke.  The patient wears seatbelts.   Pt denies any domestic violence. DENIES tattoos or blood transfusions    SCREENING:   Pap:  NILM/ HPV NEG   Mammogram: 2025 NEG, TC Score: 27 %   Colonoscopy: N/A   DEXA:  N/A     GYN FH:   Breast cancer: none  Colon cancer: pgm  Ovarian cancer: none  Endometrial cancer: none     OB History    Para Term  AB Living   0 0 0 0 0 0   SAB IAB Ectopic Multiple Live Births   0 0 0 0 0        Past Medical History:  Past Medical History:   Diagnosis Date    Left thyroid nodule     Vitiligo        Past Surgical History:  Past Surgical History:   Procedure Laterality Date    WISDOM TOOTH EXTRACTION         Family History:  Family History   Problem Relation Name Age of Onset    Fibroids Mother      Hypertension Mother      Thyroid nodules Mother      Graves' disease Father      Thyroid disease Father      Thyroid cancer Sister half sister     Hypertension Maternal Grandmother      Colon cancer Paternal Grandmother      Diabetes Maternal Aunt      Breast cancer Paternal Aunt      Ovarian cancer Neg Hx      Melanoma Neg Hx         Allergies:  Review of patient's allergies indicates:   Allergen Reactions    House dust mite Other (See Comments)       Medications:  Medications Ordered Prior to Encounter[1]    Social History:  Social History[2]            ROS:   GENERAL: Feeling well overall. Denies fever or chills.   SKIN: Denies rash or lesions.   HEAD: Denies head injury or headache.   NODES: Denies enlarged  lymph nodes.   CHEST: Denies chest pain or shortness of breath.   CARDIOVASCULAR: Denies palpitations or left sided chest pain.   ABDOMEN: No abdominal pain, constipation, diarrhea, nausea, vomiting or rectal bleeding.   URINARY: No dysuria, hematuria, or burning on urination.  REPRODUCTIVE: See HPI.   BREASTS: Denies pain, lumps, or nipple discharge.   HEMATOLOGIC: No easy bruisability or excessive bleeding.   MUSCULOSKELETAL: Denies joint pain or swelling.   NEUROLOGIC: Denies syncope or weakness.   PSYCHIATRIC: Denies depression, anxiety or mood swings.    PE:   APPEARANCE: Well nourished, well developed, Black or  female in no acute distress.  NODES: no cervical, supraclavicular, or inguinal lymphadenopathy  BREASTS: Symmetrical, no skin changes or visible lesions. No palpable masses, nipple discharge or adenopathy bilaterally.  ABDOMEN: Soft. No tenderness or masses. No distention. No hernias palpated. No CVA tenderness.  VULVA: No lesions. Normal external female genitalia.  URETHRAL MEATUS: Normal size and location, no lesions, no prolapse.  URETHRA: No masses, tenderness, or prolapse.  VAGINA:  Moist. No lesions or lacerations noted. No abnormal discharge present. No odor present.   CERVIX: No lesions or discharge. No cervical motion tenderness.   UTERUS: Normal size, regular shape, mobile, non-tender.  ADNEXA: No tenderness. No fullness or masses palpated in the adnexal regions.   ANUS PERINEUM: Normal.      Diagnosis:  1. Encounter for gynecological examination without abnormal finding    2. Screening breast examination    3. Dysmenorrhea    4. Family history of thyroid cancer    5. Goiter        Plan:     Orders Placed This Encounter    US Pelvis Comp with Transvag NON-OB (xpd    Prolactin    Luteinizing Hormone    Follicle Stimulating Hormone    C. trachomatis/N. gonorrhoeae by AMP DNA    Vaginosis Screen by DNA Probe    Liquid-Based Pap Smear, Screening     - Self breast exams  encouraged  - Pap and HPV done today.  - Screening tests as ordered.  - Diet and exercise encouraged.  Condom use encouraged for STD prevention.  Seat belt use encouraged.  Reviewed ASCCP Pap guidelines and screening recommendations.  Calcium and vitamin D recommended.     Counseling: injury prevention: Driving under the influence of alcohol  Weapons  Seatbelts  Bicycle helmets  Adequate sleep  Exercise  Stress management techniques  indications for and frequency of periodic gynecologic exam  reviewed current Pap guidelines. Explained new understanding of natural history of cervical disease and improved Paps. Recommended guideline concordant care.    The patient was counseled today on ACS PAP guidelines, with recommendations for yearly pelvic exams unless their uterus, cervix, and ovaries were removed for benign reasons; in that case, examinations every 3-5 years are recommended.  The patient was also counseled regarding monthly breast self-examination, routine STD screening for at-risk populations, prophylactic immunizations for transmitted infections such as  HPV, Pertussis, or Influenza as appropriate, and yearly mammograms when indicated by ACS guidelines.  She was advised to see her primary care physician for all other health maintenance.    Counseling session lasted approximately 10 minutes, and all her questions were answered.    Follow-up with me in 1 year for routine exam or sooner if needed.    Genie Gastelum, MARGARITA-BC                 [1]   Current Outpatient Medications on File Prior to Visit   Medication Sig Dispense Refill    dexAMETHasone (DECADRON) 4 MG Tab Take one tablet by mouth every Saturday and Sunday x 8 weeks. Take in the morning with breakfast. 16 tablet 0    ruxolitinib 1.5 % Crea Apply to affected areas of face and body twice daily as needed for vitiligo. 60 g 3    tacrolimus (PROTOPIC) 0.1 % ointment Apply to affected areas of face and body 2 times daily 100 g 5     No current  facility-administered medications on file prior to visit.   [2]   Social History  Tobacco Use    Smoking status: Never    Smokeless tobacco: Never   Substance Use Topics    Alcohol use: Yes     Comment: socially    Drug use: No

## 2025-07-31 LAB
INSULIN SERPL-ACNC: NORMAL U[IU]/ML
LAB AP BETHESDA CATEGORY: NORMAL
LAB AP CLINICAL FINDINGS: NORMAL
LAB AP CONTRACEPTIVES: NORMAL
LAB AP LMP DATE: NORMAL
LAB AP OCHS PAP SPECIMEN ADEQUACY: NORMAL
LAB AP OHS PAP INTERPRETATION: NORMAL
LAB AP PAP DISCLAIMER COMMENTS: NORMAL
LAB AP PAP ESTROGEN REPLACEMENT THERAPY: NORMAL
LAB AP PAP PMP: NORMAL
LAB AP PAP PREVIOUS BX: NORMAL
LAB AP PAP PRIOR TREATMENT: NORMAL
LAB AP PERFORMING LOCATION(S): NORMAL

## 2025-08-01 ENCOUNTER — PATIENT MESSAGE (OUTPATIENT)
Dept: OBSTETRICS AND GYNECOLOGY | Facility: CLINIC | Age: 40
End: 2025-08-01
Payer: MEDICAID

## 2025-08-01 DIAGNOSIS — B37.9 CANDIDA GLABRATA INFECTION: ICD-10-CM

## 2025-08-01 DIAGNOSIS — B37.9 CANDIDA GLABRATA INFECTION: Primary | ICD-10-CM

## 2025-08-01 LAB
BACTERIAL VAGINOSIS DNA (OHS): NOT DETECTED
C TRACH DNA SPEC QL NAA+PROBE: NOT DETECTED
CANDIDA GLABRATA/KRUSEI DNA (OHS): DETECTED
CANDIDA SPECIES DNA (OHS): NOT DETECTED
CTGC SOURCE (OHS) ORD-325: NORMAL
N GONORRHOEA DNA UR QL NAA+PROBE: NOT DETECTED
TRICHOMONAS VAGINALIS DNA (OHS): NOT DETECTED

## 2025-08-06 ENCOUNTER — TELEPHONE (OUTPATIENT)
Dept: OBSTETRICS AND GYNECOLOGY | Facility: CLINIC | Age: 40
End: 2025-08-06
Payer: MEDICAID

## 2025-08-06 NOTE — TELEPHONE ENCOUNTER
"Copied from CRM #[id number]. Topic: Appointments - Appointment Access  >> Aug 6, 2025 10:14 AM [place] wrote:  Pt Requesting to Schedule an Appointment     Pt is requesting to schedule an appointment that our scheduling dept cannot schedule.    Who called: pt  Best call back #: [phone]  When pt wants appt: "pretty much any date"   Reason for appt: fibroids   Additional notes:  "

## 2025-08-06 NOTE — TELEPHONE ENCOUNTER
Pt called to schedule fibroid consult. She recently found out she had fibroids. She would like to see you for consult. Consult scheduled 9/11    US done 7/28/25. MRI?

## 2025-08-19 ENCOUNTER — PATIENT MESSAGE (OUTPATIENT)
Dept: DERMATOLOGY | Facility: CLINIC | Age: 40
End: 2025-08-19
Payer: MEDICAID